# Patient Record
Sex: FEMALE | Race: WHITE | URBAN - METROPOLITAN AREA
[De-identification: names, ages, dates, MRNs, and addresses within clinical notes are randomized per-mention and may not be internally consistent; named-entity substitution may affect disease eponyms.]

---

## 2021-05-27 ENCOUNTER — HOSPITAL ENCOUNTER (OUTPATIENT)
Age: 46
Setting detail: SPECIMEN
Discharge: HOME | End: 2021-05-27
Payer: COMMERCIAL

## 2021-05-27 ENCOUNTER — OFFICE VISIT (OUTPATIENT)
Dept: OBGYN CLINIC | Age: 46
End: 2021-05-27
Payer: COMMERCIAL

## 2021-05-27 VITALS
WEIGHT: 180 LBS | DIASTOLIC BLOOD PRESSURE: 68 MMHG | BODY MASS INDEX: 31.89 KG/M2 | HEIGHT: 63 IN | SYSTOLIC BLOOD PRESSURE: 122 MMHG

## 2021-05-27 DIAGNOSIS — Z12.31 ENCOUNTER FOR SCREENING MAMMOGRAM FOR MALIGNANT NEOPLASM OF BREAST: Primary | ICD-10-CM

## 2021-05-27 DIAGNOSIS — Z01.419 WOMEN'S ANNUAL ROUTINE GYNECOLOGICAL EXAMINATION: ICD-10-CM

## 2021-05-27 DIAGNOSIS — N95.1 FEMALE CLIMACTERIC STATE: ICD-10-CM

## 2021-05-27 DIAGNOSIS — Z12.4 PAP SMEAR FOR CERVICAL CANCER SCREENING: ICD-10-CM

## 2021-05-27 PROCEDURE — 36415 COLL VENOUS BLD VENIPUNCTURE: CPT | Performed by: NURSE PRACTITIONER

## 2021-05-27 PROCEDURE — 99386 PREV VISIT NEW AGE 40-64: CPT | Performed by: NURSE PRACTITIONER

## 2021-05-27 NOTE — PROGRESS NOTES
YEARLY PHYSICAL    Date of service: 2021    Riley Sagastume  Is a 39 y.o. female    PT's PCP is: No primary care provider on file. : 1975                                             Subjective:       No LMP recorded. (Menstrual status: Other - See Notes). Are your menses regular: no menses w/Mirena    OB History    Para Term  AB Living   2 2 2     2   SAB TAB Ectopic Molar Multiple Live Births             2      # Outcome Date GA Lbr Neno/2nd Weight Sex Delivery Anes PTL Lv   2 Term  39w0d   M Vag-Spont   MICHAEL   1 Term  37w0d   F Vag-Spont   MICHAEL        Social History     Tobacco Use   Smoking Status Current Every Day Smoker    Packs/day: 0.50    Types: Cigarettes   Smokeless Tobacco Never Used        Social History     Substance and Sexual Activity   Alcohol Use Yes    Comment: rare       Family History   Problem Relation Age of Onset    No Known Problems Paternal Grandfather     No Known Problems Paternal Grandmother     No Known Problems Maternal Grandmother     No Known Problems Maternal Grandfather     No Known Problems Father     Diabetes Mother        Any family history of breast or ovarian cancer: No    Any family history of blood clots: No    Allergies: Pcn [penicillins]    No current outpatient medications on file. Social History     Substance and Sexual Activity   Sexual Activity Yes    Partners: Male       Any bleeding or pain with intercourse: No    Last Yearly:  2017    Last pap: 10/2016, negative     Last HPV: 10/2016    Last Mammogram:     Last Dexascan n/a    Last colorectal screen- n/a    Do you do self breast exams: encouraged monthly SBE     History reviewed. No pertinent past medical history.     Past Surgical History:   Procedure Laterality Date    FOOT SURGERY         Family History   Problem Relation Age of Onset    No Known Problems Paternal Grandfather     No Known grasped with ring forceps and with gentle downward traction IUD was removed intact. Patient tolerated well. Uterus:  Size normal, Tenderness absent  Adenexa: Masses absent, Tenderness absent  Anus/Perineum:  Lesions absent and Masses absent                                    Vaginal discharge: no vaginal discharge   Chaperone: not present                             Assessment and Plan          Diagnosis Orders   1. Encounter for screening mammogram for malignant neoplasm of breast  KATELYN DIGITAL SCREEN W OR WO CAD BILATERAL   2. Pap smear for cervical cancer screening  PAP SMEAR   3. Female climacteric state  Follicle Stimulating Hormone    Luteinizing Hormone    TSH With Reflex Ft4   4. Women's annual routine gynecological examination  hCG, Quantitative, Pregnancy       Mirena  2018. Patient still has not had menses, discussed potential suppression due to Mirena or menopause. Reports hot flashes at times since the Fall. Patient agreeable to proceed with labs, if not indicating menopause will start Slynd to manage menses. Reviewed risks/benefits. Francisco Sampson does not currently have medications on file. Return in about 1 year (around 2022) for yearly. She was also counseled on her preventative health maintenance recommendations and follow-up. There are no Patient Instructions on file for this visit.     Gary Kramer, APRN - NP,2021 8:25 AM

## 2021-06-02 LAB
CYTOLOGY REPORT: NORMAL
HPV SAMPLE: NORMAL
HPV, GENOTYPE 16: NOT DETECTED
HPV, GENOTYPE 18: NOT DETECTED
HPV, HIGH RISK OTHER: NOT DETECTED
HPV, INTERPRETATION: NORMAL
SPECIMEN DESCRIPTION: NORMAL

## 2023-10-12 ENCOUNTER — APPOINTMENT (OUTPATIENT)
Dept: CT IMAGING | Age: 48
End: 2023-10-12

## 2023-10-12 ENCOUNTER — HOSPITAL ENCOUNTER (EMERGENCY)
Age: 48
Discharge: HOME | End: 2023-10-12
Attending: EMERGENCY MEDICINE | Admitting: EMERGENCY MEDICINE

## 2023-10-12 VITALS
HEIGHT: 63 IN | RESPIRATION RATE: 18 BRPM | HEART RATE: 59 BPM | WEIGHT: 170 LBS | TEMPERATURE: 98 F | SYSTOLIC BLOOD PRESSURE: 144 MMHG | OXYGEN SATURATION: 94 % | DIASTOLIC BLOOD PRESSURE: 99 MMHG | BODY MASS INDEX: 30.12 KG/M2

## 2023-10-12 DIAGNOSIS — R94.31 NONSPECIFIC ST-T WAVE ELECTROCARDIOGRAPHIC CHANGES: ICD-10-CM

## 2023-10-12 DIAGNOSIS — K59.00 CONSTIPATION, UNSPECIFIED CONSTIPATION TYPE: ICD-10-CM

## 2023-10-12 DIAGNOSIS — K29.00 ACUTE GASTRITIS WITHOUT HEMORRHAGE, UNSPECIFIED GASTRITIS TYPE: Primary | ICD-10-CM

## 2023-10-12 LAB
ALBUMIN SERPL-MCNC: 4.7 G/DL (ref 3.5–4.6)
ALP SERPL-CCNC: 97 U/L (ref 40–130)
ALT SERPL-CCNC: 39 U/L (ref 0–33)
AMORPH SED URNS QL MICRO: ABNORMAL
ANION GAP SERPL CALCULATED.3IONS-SCNC: 11 MEQ/L (ref 9–15)
AST SERPL-CCNC: 21 U/L (ref 0–35)
BACTERIA URNS QL MICRO: ABNORMAL /HPF
BASOPHILS # BLD: 0.1 K/UL (ref 0–0.1)
BASOPHILS NFR BLD: 0.7 % (ref 0.1–1.2)
BILIRUB SERPL-MCNC: 0.6 MG/DL (ref 0.2–0.7)
BILIRUB UR QL STRIP: ABNORMAL
BUN SERPL-MCNC: 11 MG/DL (ref 6–20)
CALCIUM SERPL-MCNC: 9.4 MG/DL (ref 8.5–9.9)
CHLORIDE SERPL-SCNC: 99 MEQ/L (ref 95–107)
CLARITY UR: ABNORMAL
CO2 SERPL-SCNC: 29 MEQ/L (ref 20–31)
COLOR UR: ABNORMAL
CREAT SERPL-MCNC: 0.72 MG/DL (ref 0.5–0.9)
EOSINOPHIL # BLD: 0 K/UL (ref 0–0.4)
EOSINOPHIL NFR BLD: 0.1 % (ref 0.7–5.8)
EPI CELLS #/AREA URNS HPF: ABNORMAL /HPF
ERYTHROCYTE [DISTWIDTH] IN BLOOD BY AUTOMATED COUNT: 12.3 % (ref 11.7–14.4)
GLOBULIN SER CALC-MCNC: 2.8 G/DL (ref 2.3–3.5)
GLUCOSE SERPL-MCNC: 161 MG/DL (ref 70–99)
GLUCOSE UR STRIP-MCNC: NEGATIVE MG/DL
HCT VFR BLD AUTO: 47.3 % (ref 37–47)
HGB BLD-MCNC: 16.5 G/DL (ref 11.2–15.7)
HGB UR QL STRIP: NEGATIVE
IMM GRANULOCYTES # BLD: 0 K/UL
IMM GRANULOCYTES NFR BLD: 0.3 %
INR PPP: 1
KETONES UR STRIP-MCNC: 40 MG/DL
LACTATE BLDV-SCNC: 1.8 MMOL/L (ref 0.5–2.2)
LEUKOCYTE ESTERASE UR QL STRIP: NEGATIVE
LIPASE SERPL-CCNC: 22 U/L (ref 12–95)
LYMPHOCYTES # BLD: 1.6 K/UL (ref 1.2–3.7)
LYMPHOCYTES NFR BLD: 13 %
MAGNESIUM SERPL-MCNC: 2.1 MG/DL (ref 1.7–2.4)
MCH RBC QN AUTO: 30.2 PG (ref 25.6–32.2)
MCHC RBC AUTO-ENTMCNC: 34.9 % (ref 32.2–35.5)
MCV RBC AUTO: 86.6 FL (ref 79.4–94.8)
MONOCYTES # BLD: 0.7 K/UL (ref 0.2–0.9)
MONOCYTES NFR BLD: 5.4 % (ref 4.7–12.5)
NEUTROPHILS # BLD: 9.8 K/UL (ref 1.6–6.1)
NEUTS SEG NFR BLD: 80.5 % (ref 34–71.1)
NITRITE UR QL STRIP: NEGATIVE
PH UR STRIP: 7.5 [PH] (ref 5–9)
PLATELET # BLD AUTO: 464 K/UL (ref 182–369)
POTASSIUM SERPL-SCNC: 3.3 MEQ/L (ref 3.4–4.9)
PROT SERPL-MCNC: 7.5 G/DL (ref 6.3–8)
PROT UR STRIP-MCNC: 100 MG/DL
PROTHROMBIN TIME: 13 SEC (ref 12.3–14.9)
RBC # BLD AUTO: 5.46 M/UL (ref 3.93–5.22)
RBC #/AREA URNS HPF: ABNORMAL /HPF (ref 0–2)
SODIUM SERPL-SCNC: 139 MEQ/L (ref 135–144)
SP GR UR STRIP: 1.02 (ref 1–1.03)
TROPONIN, HIGH SENSITIVITY: <6 NG/L (ref 0–19)
UROBILINOGEN UR STRIP-ACNC: 2 E.U./DL
WBC # BLD AUTO: 12.2 K/UL (ref 4–10)
WBC #/AREA URNS HPF: ABNORMAL /HPF (ref 0–5)

## 2023-10-12 PROCEDURE — 85025 COMPLETE CBC W/AUTO DIFF WBC: CPT

## 2023-10-12 PROCEDURE — 93005 ELECTROCARDIOGRAM TRACING: CPT

## 2023-10-12 PROCEDURE — 6360000002 HC RX W HCPCS: Performed by: EMERGENCY MEDICINE

## 2023-10-12 PROCEDURE — 84484 ASSAY OF TROPONIN QUANT: CPT

## 2023-10-12 PROCEDURE — 36415 COLL VENOUS BLD VENIPUNCTURE: CPT

## 2023-10-12 PROCEDURE — 85610 PROTHROMBIN TIME: CPT

## 2023-10-12 PROCEDURE — 96376 TX/PRO/DX INJ SAME DRUG ADON: CPT

## 2023-10-12 PROCEDURE — 99285 EMERGENCY DEPT VISIT HI MDM: CPT

## 2023-10-12 PROCEDURE — 6370000000 HC RX 637 (ALT 250 FOR IP): Performed by: EMERGENCY MEDICINE

## 2023-10-12 PROCEDURE — 96375 TX/PRO/DX INJ NEW DRUG ADDON: CPT

## 2023-10-12 PROCEDURE — 80053 COMPREHEN METABOLIC PANEL: CPT

## 2023-10-12 PROCEDURE — 83605 ASSAY OF LACTIC ACID: CPT

## 2023-10-12 PROCEDURE — 81001 URINALYSIS AUTO W/SCOPE: CPT

## 2023-10-12 PROCEDURE — 96365 THER/PROPH/DIAG IV INF INIT: CPT

## 2023-10-12 PROCEDURE — C9113 INJ PANTOPRAZOLE SODIUM, VIA: HCPCS | Performed by: EMERGENCY MEDICINE

## 2023-10-12 PROCEDURE — 83690 ASSAY OF LIPASE: CPT

## 2023-10-12 PROCEDURE — 6360000004 HC RX CONTRAST MEDICATION: Performed by: EMERGENCY MEDICINE

## 2023-10-12 PROCEDURE — 74177 CT ABD & PELVIS W/CONTRAST: CPT

## 2023-10-12 PROCEDURE — 2580000003 HC RX 258: Performed by: EMERGENCY MEDICINE

## 2023-10-12 PROCEDURE — 83735 ASSAY OF MAGNESIUM: CPT

## 2023-10-12 RX ORDER — OMEPRAZOLE 40 MG/1
40 CAPSULE, DELAYED RELEASE ORAL
Qty: 30 CAPSULE | Refills: 1 | Status: SHIPPED | OUTPATIENT
Start: 2023-10-12

## 2023-10-12 RX ORDER — ONDANSETRON 4 MG/1
4 TABLET, ORALLY DISINTEGRATING ORAL
Qty: 10 TABLET | Refills: 1 | Status: SHIPPED | OUTPATIENT
Start: 2023-10-12

## 2023-10-12 RX ORDER — ONDANSETRON 4 MG/1
4 TABLET, ORALLY DISINTEGRATING ORAL
Qty: 10 TABLET | Refills: 1 | Status: SHIPPED | OUTPATIENT
Start: 2023-10-12 | End: 2023-10-12 | Stop reason: SDUPTHER

## 2023-10-12 RX ORDER — 0.9 % SODIUM CHLORIDE 0.9 %
1000 INTRAVENOUS SOLUTION INTRAVENOUS ONCE
Status: COMPLETED | OUTPATIENT
Start: 2023-10-12 | End: 2023-10-12

## 2023-10-12 RX ORDER — ONDANSETRON 2 MG/ML
4 INJECTION INTRAMUSCULAR; INTRAVENOUS ONCE
Status: COMPLETED | OUTPATIENT
Start: 2023-10-12 | End: 2023-10-12

## 2023-10-12 RX ORDER — KETOROLAC TROMETHAMINE 15 MG/ML
15 INJECTION, SOLUTION INTRAMUSCULAR; INTRAVENOUS ONCE
Status: COMPLETED | OUTPATIENT
Start: 2023-10-12 | End: 2023-10-12

## 2023-10-12 RX ORDER — POLYETHYLENE GLYCOL 3350 17 G/17G
17 POWDER, FOR SOLUTION ORAL DAILY PRN
Qty: 5 PACKET | Refills: 0 | Status: SHIPPED | OUTPATIENT
Start: 2023-10-12 | End: 2023-10-12 | Stop reason: SDUPTHER

## 2023-10-12 RX ORDER — OMEPRAZOLE 40 MG/1
40 CAPSULE, DELAYED RELEASE ORAL
Qty: 30 CAPSULE | Refills: 1 | Status: SHIPPED | OUTPATIENT
Start: 2023-10-12 | End: 2023-10-12 | Stop reason: SDUPTHER

## 2023-10-12 RX ORDER — POLYETHYLENE GLYCOL 3350 17 G/17G
17 POWDER, FOR SOLUTION ORAL DAILY PRN
Qty: 5 PACKET | Refills: 0 | Status: SHIPPED | OUTPATIENT
Start: 2023-10-12 | End: 2023-10-17

## 2023-10-12 RX ADMIN — ONDANSETRON 4 MG: 2 INJECTION INTRAMUSCULAR; INTRAVENOUS at 16:35

## 2023-10-12 RX ADMIN — ONDANSETRON 4 MG: 2 INJECTION INTRAMUSCULAR; INTRAVENOUS at 14:31

## 2023-10-12 RX ADMIN — Medication: at 16:17

## 2023-10-12 RX ADMIN — SODIUM CHLORIDE 1000 ML: 9 INJECTION, SOLUTION INTRAVENOUS at 14:29

## 2023-10-12 RX ADMIN — IOPAMIDOL 75 ML: 755 INJECTION, SOLUTION INTRAVENOUS at 15:08

## 2023-10-12 RX ADMIN — SODIUM CHLORIDE 80 MG: 9 INJECTION, SOLUTION INTRAVENOUS at 16:13

## 2023-10-12 RX ADMIN — KETOROLAC TROMETHAMINE 15 MG: 15 INJECTION, SOLUTION INTRAMUSCULAR; INTRAVENOUS at 14:31

## 2023-10-12 ASSESSMENT — LIFESTYLE VARIABLES: HOW OFTEN DO YOU HAVE A DRINK CONTAINING ALCOHOL: NEVER

## 2023-10-12 NOTE — ED PROVIDER NOTES
CC/HPI: 44-year-old female to the emergency department chief complaint of nausea vomiting epigastric discomfort and feeling constipated. Patient denies sick contacts no recent travel. No fever no chills. Patient states the constipation has been for 5 days she states that she does not feel like she has had a good bowel movement. She has noticed no blood in her stools no dark tarry stools. Patient states the vomiting began 2 days ago she has thrown up approximately 8-10 times. No blood in her vomit. Patient denies chest pain or shortness of breath no lightheadedness or dizziness. Patient denies take any anti-inflammatories or drinking alcohol other than on occasion      VITALS/PMH/PSH: Reviewed per nurses notes    REVIEW OF SYSTEMS: As in chief complaint history of present illness, otherwise all other systems are reviewed and negative the total 10 systems reviewed    PHYSICAL EXAM:  GEN: Pt alert and oriented, no acute distress. Patient appears uncomfortable. Appears nauseated. HEENT:         Normocephalic/Atramatic        PERRL, EOMI       Throat non-edematous. No erythema noted. No exudates noted. Moist membranes  NECK: Nontender, no signs of trauma, no lymphadenopathy  HEART: Reg S1/S2, without murmer, rub or gallop  LUNGS: Clear to auscultation bilaterally, respirations even and unlabored  ABDOMEN: Bowel sounds positive, soft, nondistended. Patient with mild appearing tenderness lower abdomen. More intense appearing tenderness upper abdomen bilaterally mostly over epigastrium. No pulsatile masses. No guarding rebound or rigidity. MUSCULOSKELETAL/EXTREMITITES:  No signs of trauma, cyanosis or edema. No CVA tenderness  LYMPH: no peripheral lympadenopathy noted  SKIN:  Warm & dry, no rash  NEUROLOGIC:  Alert and oriented.   Speech clear    Medical decision making/ED course;  44-year-old female to the emergency department with nausea vomiting x2 days constipation x5 days and generalized abdominal

## 2023-10-13 LAB
EKG ATRIAL RATE: 54 BPM
EKG P AXIS: 35 DEGREES
EKG P-R INTERVAL: 150 MS
EKG Q-T INTERVAL: 440 MS
EKG QRS DURATION: 76 MS
EKG QTC CALCULATION (BAZETT): 417 MS
EKG R AXIS: 11 DEGREES
EKG T AXIS: 240 DEGREES
EKG VENTRICULAR RATE: 54 BPM

## 2023-10-13 PROCEDURE — 93010 ELECTROCARDIOGRAM REPORT: CPT | Performed by: INTERNAL MEDICINE

## 2024-12-26 ENCOUNTER — APPOINTMENT (OUTPATIENT)
Dept: GENERAL RADIOLOGY | Age: 49
End: 2024-12-26

## 2024-12-26 ENCOUNTER — HOSPITAL ENCOUNTER (EMERGENCY)
Age: 49
Discharge: HOME | End: 2024-12-26

## 2024-12-26 VITALS
HEART RATE: 77 BPM | WEIGHT: 160 LBS | DIASTOLIC BLOOD PRESSURE: 82 MMHG | TEMPERATURE: 98.1 F | OXYGEN SATURATION: 96 % | BODY MASS INDEX: 28.35 KG/M2 | SYSTOLIC BLOOD PRESSURE: 110 MMHG | RESPIRATION RATE: 18 BRPM | HEIGHT: 63 IN

## 2024-12-26 DIAGNOSIS — S93.401A SPRAIN OF RIGHT ANKLE, UNSPECIFIED LIGAMENT, INITIAL ENCOUNTER: Primary | ICD-10-CM

## 2024-12-26 PROCEDURE — 73630 X-RAY EXAM OF FOOT: CPT

## 2024-12-26 PROCEDURE — 6370000000 HC RX 637 (ALT 250 FOR IP)

## 2024-12-26 PROCEDURE — 99283 EMERGENCY DEPT VISIT LOW MDM: CPT

## 2024-12-26 PROCEDURE — 73610 X-RAY EXAM OF ANKLE: CPT

## 2024-12-26 RX ORDER — IBUPROFEN 600 MG/1
600 TABLET, FILM COATED ORAL ONCE
Status: COMPLETED | OUTPATIENT
Start: 2024-12-26 | End: 2024-12-26

## 2024-12-26 RX ORDER — IBUPROFEN 600 MG/1
600 TABLET, FILM COATED ORAL 3 TIMES DAILY PRN
Qty: 30 TABLET | Refills: 0 | Status: SHIPPED | OUTPATIENT
Start: 2024-12-26

## 2024-12-26 RX ADMIN — IBUPROFEN 600 MG: 600 TABLET, FILM COATED ORAL at 11:08

## 2024-12-26 ASSESSMENT — PAIN DESCRIPTION - LOCATION
LOCATION: ANKLE
LOCATION: ANKLE

## 2024-12-26 ASSESSMENT — PAIN DESCRIPTION - PAIN TYPE: TYPE: ACUTE PAIN

## 2024-12-26 ASSESSMENT — PAIN DESCRIPTION - ORIENTATION
ORIENTATION: RIGHT
ORIENTATION: RIGHT

## 2024-12-26 ASSESSMENT — ENCOUNTER SYMPTOMS
RESPIRATORY NEGATIVE: 1
COLOR CHANGE: 0

## 2024-12-26 ASSESSMENT — PAIN - FUNCTIONAL ASSESSMENT: PAIN_FUNCTIONAL_ASSESSMENT: 0-10

## 2024-12-26 ASSESSMENT — PAIN DESCRIPTION - FREQUENCY: FREQUENCY: CONTINUOUS

## 2024-12-26 ASSESSMENT — LIFESTYLE VARIABLES
HOW OFTEN DO YOU HAVE A DRINK CONTAINING ALCOHOL: MONTHLY OR LESS
HOW MANY STANDARD DRINKS CONTAINING ALCOHOL DO YOU HAVE ON A TYPICAL DAY: 1 OR 2

## 2024-12-26 ASSESSMENT — PAIN SCALES - GENERAL
PAINLEVEL_OUTOF10: 9
PAINLEVEL_OUTOF10: 8

## 2024-12-26 ASSESSMENT — PAIN DESCRIPTION - DESCRIPTORS: DESCRIPTORS: SHARP

## 2024-12-26 ASSESSMENT — PAIN DESCRIPTION - ONSET: ONSET: ON-GOING

## 2024-12-26 NOTE — ED PROVIDER NOTES
[facility] ED  EMERGENCY DEPARTMENT ENCOUNTER      Pt Name: Sandy Joy  MRN: [id number]  Birthdate 1975  Date of evaluation: 12/26/2024  Provider: RAMANA Amato CNP      HISTORY OF PRESENT ILLNESS    Sandy Joy is a 49 y.o. female who presents to the Emergency Department with ankle pain from a fall yesterday.  Reports twisting Right ankle when walking down steps.  Has pain and swelling.  Denies any other injury.        REVIEW OF SYSTEMS       Review of Systems   Constitutional: Negative.    Respiratory: Negative.     Cardiovascular: Negative.    Genitourinary: Negative.    Musculoskeletal:  Positive for arthralgias, gait problem and joint swelling. Negative for myalgias and neck pain.   Skin:  Negative for color change.   Neurological:  Positive for numbness.   Hematological: Negative.    Psychiatric/Behavioral: Negative.           PAST MEDICAL HISTORY   History reviewed. No pertinent past medical history.      SURGICAL HISTORY       Past Surgical History:   Procedure Laterality Date    FOOT SURGERY           CURRENT MEDICATIONS       Current Discharge Medication List        CONTINUE these medications which have NOT CHANGED    Details   omeprazole (PRILOSEC) 40 MG delayed release capsule Take 1 capsule by mouth every morning (before breakfast)  Qty: 30 capsule, Refills: 1      ondansetron (ZOFRAN-ODT) 4 MG disintegrating tablet Take 1 tablet by mouth every 4-6 hours as needed for Nausea or Vomiting  Qty: 10 tablet, Refills: 1             ALLERGIES     Pcn [penicillins]    FAMILY HISTORY       Family History   Problem Relation Age of Onset    No Known Problems Paternal Grandfather     No Known Problems Paternal Grandmother     No Known Problems Maternal Grandmother     No Known Problems Maternal Grandfather     No Known Problems Father     Diabetes Mother           SOCIAL HISTORY       Social History     Socioeconomic History    Marital status: Legally      Spouse name: None

## 2024-12-26 NOTE — ED TRIAGE NOTES
Patient fell down one step and twisted right ankle yesterday. Pt denies LOC. She has swelling and pain with ambulation.

## 2025-02-14 ENCOUNTER — APPOINTMENT (OUTPATIENT)
Dept: CT IMAGING | Age: 50
DRG: 055 | End: 2025-02-14
Payer: COMMERCIAL

## 2025-02-14 ENCOUNTER — HOSPITAL ENCOUNTER (INPATIENT)
Age: 50
LOS: 1 days | Discharge: HOME | DRG: 055 | End: 2025-02-15
Attending: STUDENT IN AN ORGANIZED HEALTH CARE EDUCATION/TRAINING PROGRAM | Admitting: SURGERY
Payer: COMMERCIAL
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
## 2025-02-14 DIAGNOSIS — I60.9 SUBARACHNOID HEMORRHAGE (HCC): Primary | ICD-10-CM

## 2025-02-14 DIAGNOSIS — Z00.6 ENCOUNTER FOR EXAMINATION FOR NORMAL COMPARISON OR CONTROL IN CLINICAL RESEARCH PROGRAM: ICD-10-CM

## 2025-02-14 PROBLEM — R00.1 BRADYCARDIA: Status: ACTIVE | Noted: 2025-02-14

## 2025-02-14 PROBLEM — T14.90XA TRAUMA: Status: ACTIVE | Noted: 2025-02-14

## 2025-02-14 LAB
ABO GROUP BLD: NORMAL
ALBUMIN SERPL BCG-MCNC: 4.6 G/DL (ref 3.5–5.1)
ALP SERPL-CCNC: 84 U/L (ref 38–126)
ALT SERPL W/O P-5'-P-CCNC: 22 U/L (ref 11–66)
AMPHETAMINES UR QL SCN: NEGATIVE
ANION GAP SERPL CALC-SCNC: 13 MEQ/L (ref 8–16)
APTT PPP: 30.8 SECONDS (ref 22–38)
AST SERPL-CCNC: 20 U/L (ref 5–40)
B-HCG SERPL QL: NEGATIVE
BACTERIA: NORMAL
BARBITURATES UR QL SCN: NEGATIVE
BASOPHILS ABSOLUTE: 0.1 THOU/MM3 (ref 0–0.1)
BASOPHILS NFR BLD AUTO: 0.8 %
BENZODIAZ UR QL SCN: NEGATIVE
BILIRUB CONJ SERPL-MCNC: < 0.1 MG/DL (ref 0–0.3)
BILIRUB SERPL-MCNC: 0.4 MG/DL (ref 0.3–1.2)
BILIRUB UR QL STRIP: NEGATIVE
BUN SERPL-MCNC: 13 MG/DL (ref 7–22)
BUN SERPL-MCNC: 13 MG/DL (ref 7–22)
BZE UR QL SCN: NEGATIVE
CALCIUM SERPL-MCNC: 8.5 MG/DL (ref 8.5–10.5)
CANNABINOIDS UR QL SCN: NEGATIVE
CASTS #/AREA URNS LPF: NORMAL /LPF
CASTS #/AREA URNS LPF: NORMAL /LPF
CHARACTER UR: NORMAL
CHARCOAL URNS QL MICRO: NORMAL
CHLORIDE SERPL-SCNC: 106 MEQ/L (ref 98–111)
CHLORIDE SERPL-SCNC: 106 MEQ/L (ref 98–111)
CO2 SERPL-SCNC: 21 MEQ/L (ref 23–33)
COLOR UR: NORMAL
COMPREHENSIVE DRUG PROMPT: NORMAL
CREAT SERPL-MCNC: 0.6 MG/DL (ref 0.4–1.2)
CREAT SERPL-MCNC: 0.7 MG/DL (ref 0.4–1.2)
CRYSTALS URNS QL MICRO: NORMAL
DEPRECATED RDW RBC AUTO: 41.3 FL (ref 35–45)
DEPRECATED RDW RBC AUTO: 41.8 FL (ref 35–45)
EOSINOPHIL NFR BLD AUTO: 1.4 %
EOSINOPHILS ABSOLUTE: 0.2 THOU/MM3 (ref 0–0.4)
EPITHELIAL CELLS, UA: NORMAL /HPF
ERYTHROCYTE [DISTWIDTH] IN BLOOD BY AUTOMATED COUNT: 12.7 % (ref 11.5–14.5)
ERYTHROCYTE [DISTWIDTH] IN BLOOD BY AUTOMATED COUNT: 12.8 % (ref 11.5–14.5)
ETHANOL SERPL-MCNC: < 0.01 % (ref 0–0.08)
ETHANOL SERPL-MCNC: < 0.01 % (ref 0–0.08)
FENTANYL: NEGATIVE
GFR SERPL CREATININE-BSD FRML MDRD: > 90 ML/MIN/1.73M2
GFR SERPL CREATININE-BSD FRML MDRD: > 90 ML/MIN/1.73M2
GLUCOSE SERPL-MCNC: 88 MG/DL (ref 70–108)
GLUCOSE SERPL-MCNC: 92 MG/DL (ref 70–108)
GLUCOSE UR QL STRIP.AUTO: NEGATIVE MG/DL
HCT VFR BLD AUTO: 43.6 % (ref 37–47)
HCT VFR BLD AUTO: 44.6 % (ref 37–47)
HGB BLD-MCNC: 14.8 GM/DL (ref 12–16)
HGB BLD-MCNC: 15.5 GM/DL (ref 12–16)
HGB UR QL STRIP.AUTO: NEGATIVE
IAT IGG-SP REAG SERPL QL: NORMAL
IMM GRANULOCYTES # BLD AUTO: 0.04 THOU/MM3 (ref 0–0.07)
IMM GRANULOCYTES NFR BLD AUTO: 0.3 %
INR PPP: 0.97 (ref 0.85–1.13)
KETONES UR QL STRIP.AUTO: NEGATIVE
LEUKOCYTE ESTERASE UR QL STRIP.AUTO: NEGATIVE
LIPASE SERPL-CCNC: 27.9 U/L (ref 5.6–51.3)
LYMPHOCYTES ABSOLUTE: 4.2 THOU/MM3 (ref 1–4.8)
LYMPHOCYTES NFR BLD AUTO: 36.4 %
MAGNESIUM SERPL-MCNC: 2.3 MG/DL (ref 1.6–2.4)
MCH RBC QN AUTO: 30.1 PG (ref 26–33)
MCH RBC QN AUTO: 30.9 PG (ref 26–33)
MCHC RBC AUTO-ENTMCNC: 33.9 GM/DL (ref 32.2–35.5)
MCHC RBC AUTO-ENTMCNC: 34.8 GM/DL (ref 32.2–35.5)
MCV RBC AUTO: 88.6 FL (ref 81–99)
MCV RBC AUTO: 88.8 FL (ref 81–99)
MONOCYTES ABSOLUTE: 0.8 THOU/MM3 (ref 0.4–1.3)
MONOCYTES NFR BLD AUTO: 7 %
MRSA DNA SPEC QL NAA+PROBE: NEGATIVE
NEUTROPHILS ABSOLUTE: 6.2 THOU/MM3 (ref 1.8–7.7)
NEUTROPHILS NFR BLD AUTO: 54.1 %
NITRITE UR QL STRIP.AUTO: NEGATIVE
NRBC BLD AUTO-RTO: 0 /100 WBC
OPIATES UR QL SCN: NORMAL
OSMOLALITY SERPL CALC.SUM OF ELEC: 278.9 MOSMOL/KG (ref 275–300)
OSMOLALITY SERPL CALC.SUM OF ELEC: 279.2 MOSMOL/KG (ref 275–300)
OXYCODONE: NORMAL
PH UR STRIP.AUTO: 6.5 [PH] (ref 5–9)
PHENCYCLIDINE QUANTITATIVE URINE: NEGATIVE
PLATELET # BLD AUTO: 413 THOU/MM3 (ref 130–400)
PLATELET # BLD AUTO: 424 THOU/MM3 (ref 130–400)
PMV BLD AUTO: 11.1 FL (ref 9.4–12.4)
PMV BLD AUTO: 11.4 FL (ref 9.4–12.4)
POTASSIUM SERPL-SCNC: 3.8 MEQ/L (ref 3.5–5.2)
POTASSIUM SERPL-SCNC: 3.9 MEQ/L (ref 3.5–5.2)
PROCALCITONIN SERPL IA-MCNC: < 0.02 NG/ML (ref 0.01–0.09)
PROT SERPL-MCNC: 6.6 G/DL (ref 6.1–8)
PROT UR STRIP.AUTO-MCNC: NEGATIVE MG/DL
RBC # BLD AUTO: 4.92 MILL/MM3 (ref 4.2–5.4)
RBC # BLD AUTO: 5.02 MILL/MM3 (ref 4.2–5.4)
RBC #/AREA URNS HPF: NORMAL /HPF
RENAL EPI CELLS #/AREA URNS HPF: NORMAL /[HPF]
RH BLD: NORMAL
SODIUM SERPL-SCNC: 140 MEQ/L (ref 135–145)
SODIUM SERPL-SCNC: 140 MEQ/L (ref 135–145)
SPECIFIC GRAVITY UA: 1.02 (ref 1–1.03)
UROBILINOGEN, URINE: 0.2 EU/DL (ref 0–1)
WBC # BLD AUTO: 11.5 THOU/MM3 (ref 4.8–10.8)
WBC # BLD AUTO: 13.7 THOU/MM3 (ref 4.8–10.8)
WBC #/AREA URNS HPF: NORMAL /HPF
YEAST LIKE FUNGI URNS QL MICRO: NORMAL

## 2025-02-14 PROCEDURE — 84703 CHORIONIC GONADOTROPIN ASSAY: CPT

## 2025-02-14 PROCEDURE — 96375 TX/PRO/DX INJ NEW DRUG ADDON: CPT

## 2025-02-14 PROCEDURE — 84295 ASSAY OF SERUM SODIUM: CPT

## 2025-02-14 PROCEDURE — 6360000002 HC RX W HCPCS: Performed by: PHYSICIAN ASSISTANT

## 2025-02-14 PROCEDURE — 85610 PROTHROMBIN TIME: CPT

## 2025-02-14 PROCEDURE — 82947 ASSAY GLUCOSE BLOOD QUANT: CPT

## 2025-02-14 PROCEDURE — 96376 TX/PRO/DX INJ SAME DRUG ADON: CPT

## 2025-02-14 PROCEDURE — 80076 HEPATIC FUNCTION PANEL: CPT

## 2025-02-14 PROCEDURE — G0378 HOSPITAL OBSERVATION PER HR: HCPCS

## 2025-02-14 PROCEDURE — 85730 THROMBOPLASTIN TIME PARTIAL: CPT

## 2025-02-14 PROCEDURE — 80305 DRUG TEST PRSMV DIR OPT OBS: CPT

## 2025-02-14 PROCEDURE — 70450 CT HEAD/BRAIN W/O DYE: CPT

## 2025-02-14 PROCEDURE — 92610 EVALUATE SWALLOWING FUNCTION: CPT

## 2025-02-14 PROCEDURE — 82435 ASSAY OF BLOOD CHLORIDE: CPT

## 2025-02-14 PROCEDURE — 86900 BLOOD TYPING SEROLOGIC ABO: CPT

## 2025-02-14 PROCEDURE — 82565 ASSAY OF CREATININE: CPT

## 2025-02-14 PROCEDURE — 97166 OT EVAL MOD COMPLEX 45 MIN: CPT

## 2025-02-14 PROCEDURE — 83735 ASSAY OF MAGNESIUM: CPT

## 2025-02-14 PROCEDURE — 2500000003 HC RX 250 WO HCPCS: Performed by: PHYSICIAN ASSISTANT

## 2025-02-14 PROCEDURE — 86901 BLOOD TYPING SEROLOGIC RH(D): CPT

## 2025-02-14 PROCEDURE — 84145 PROCALCITONIN (PCT): CPT

## 2025-02-14 PROCEDURE — 96374 THER/PROPH/DIAG INJ IV PUSH: CPT

## 2025-02-14 PROCEDURE — 6370000000 HC RX 637 (ALT 250 FOR IP): Performed by: PHYSICIAN ASSISTANT

## 2025-02-14 PROCEDURE — 84132 ASSAY OF SERUM POTASSIUM: CPT

## 2025-02-14 PROCEDURE — 83690 ASSAY OF LIPASE: CPT

## 2025-02-14 PROCEDURE — 99222 1ST HOSP IP/OBS MODERATE 55: CPT | Performed by: NEUROLOGICAL SURGERY

## 2025-02-14 PROCEDURE — 85025 COMPLETE CBC W/AUTO DIFF WBC: CPT

## 2025-02-14 PROCEDURE — 80048 BASIC METABOLIC PNL TOTAL CA: CPT

## 2025-02-14 PROCEDURE — 80307 DRUG TEST PRSMV CHEM ANLYZR: CPT

## 2025-02-14 PROCEDURE — 86885 COOMBS TEST INDIRECT QUAL: CPT

## 2025-02-14 PROCEDURE — 36415 COLL VENOUS BLD VENIPUNCTURE: CPT

## 2025-02-14 PROCEDURE — 82077 ASSAY SPEC XCP UR&BREATH IA: CPT

## 2025-02-14 PROCEDURE — 87641 MR-STAPH DNA AMP PROBE: CPT

## 2025-02-14 PROCEDURE — 85027 COMPLETE CBC AUTOMATED: CPT

## 2025-02-14 PROCEDURE — 97535 SELF CARE MNGMENT TRAINING: CPT

## 2025-02-14 PROCEDURE — 92523 SPEECH SOUND LANG COMPREHEN: CPT

## 2025-02-14 PROCEDURE — 84520 ASSAY OF UREA NITROGEN: CPT

## 2025-02-14 PROCEDURE — 99285 EMERGENCY DEPT VISIT HI MDM: CPT

## 2025-02-14 PROCEDURE — 99222 1ST HOSP IP/OBS MODERATE 55: CPT | Performed by: SURGERY

## 2025-02-14 PROCEDURE — 81001 URINALYSIS AUTO W/SCOPE: CPT

## 2025-02-14 PROCEDURE — 2140000000 HC CCU INTERMEDIATE R&B

## 2025-02-14 PROCEDURE — 6820000002 HC L2 INJURY CALL ACTIVATION: Performed by: SURGERY

## 2025-02-14 RX ORDER — ONDANSETRON 4 MG/1
4 TABLET, ORALLY DISINTEGRATING ORAL EVERY 8 HOURS PRN
Status: DISCONTINUED | OUTPATIENT
Start: 2025-02-14 | End: 2025-02-15 | Stop reason: HOSPADM

## 2025-02-14 RX ORDER — POTASSIUM CHLORIDE 29.8 MG/ML
20 INJECTION INTRAVENOUS PRN
Status: DISCONTINUED | OUTPATIENT
Start: 2025-02-14 | End: 2025-02-15 | Stop reason: HOSPADM

## 2025-02-14 RX ORDER — MORPHINE SULFATE 4 MG/ML
4 INJECTION, SOLUTION INTRAMUSCULAR; INTRAVENOUS
Status: DISCONTINUED | OUTPATIENT
Start: 2025-02-14 | End: 2025-02-15 | Stop reason: HOSPADM

## 2025-02-14 RX ORDER — PANTOPRAZOLE SODIUM 40 MG/1
40 TABLET, DELAYED RELEASE ORAL
Status: CANCELLED | OUTPATIENT
Start: 2025-02-14

## 2025-02-14 RX ORDER — POLYETHYLENE GLYCOL 3350 17 G/17G
17 POWDER, FOR SOLUTION ORAL DAILY
Status: DISCONTINUED | OUTPATIENT
Start: 2025-02-14 | End: 2025-02-15 | Stop reason: HOSPADM

## 2025-02-14 RX ORDER — SODIUM CHLORIDE 0.9 % (FLUSH) 0.9 %
5-40 SYRINGE (ML) INJECTION PRN
Status: DISCONTINUED | OUTPATIENT
Start: 2025-02-14 | End: 2025-02-15 | Stop reason: HOSPADM

## 2025-02-14 RX ORDER — SODIUM CHLORIDE 9 MG/ML
INJECTION, SOLUTION INTRAVENOUS PRN
Status: DISCONTINUED | OUTPATIENT
Start: 2025-02-14 | End: 2025-02-15 | Stop reason: HOSPADM

## 2025-02-14 RX ORDER — LEVETIRACETAM 500 MG/5ML
500 INJECTION, SOLUTION, CONCENTRATE INTRAVENOUS EVERY 12 HOURS
Status: DISCONTINUED | OUTPATIENT
Start: 2025-02-14 | End: 2025-02-15 | Stop reason: HOSPADM

## 2025-02-14 RX ORDER — ACETAMINOPHEN 325 MG/1
650 TABLET ORAL EVERY 4 HOURS PRN
Status: DISCONTINUED | OUTPATIENT
Start: 2025-02-14 | End: 2025-02-15 | Stop reason: HOSPADM

## 2025-02-14 RX ORDER — SODIUM CHLORIDE 0.9 % (FLUSH) 0.9 %
5-40 SYRINGE (ML) INJECTION EVERY 12 HOURS SCHEDULED
Status: DISCONTINUED | OUTPATIENT
Start: 2025-02-14 | End: 2025-02-15 | Stop reason: HOSPADM

## 2025-02-14 RX ORDER — POTASSIUM CHLORIDE 7.45 MG/ML
10 INJECTION INTRAVENOUS PRN
Status: DISCONTINUED | OUTPATIENT
Start: 2025-02-14 | End: 2025-02-15 | Stop reason: HOSPADM

## 2025-02-14 RX ORDER — MORPHINE SULFATE 2 MG/ML
2 INJECTION, SOLUTION INTRAMUSCULAR; INTRAVENOUS
Status: DISCONTINUED | OUTPATIENT
Start: 2025-02-14 | End: 2025-02-15 | Stop reason: HOSPADM

## 2025-02-14 RX ORDER — MAGNESIUM SULFATE IN WATER 40 MG/ML
2000 INJECTION, SOLUTION INTRAVENOUS PRN
Status: DISCONTINUED | OUTPATIENT
Start: 2025-02-14 | End: 2025-02-15 | Stop reason: HOSPADM

## 2025-02-14 RX ORDER — ONDANSETRON 2 MG/ML
4 INJECTION INTRAMUSCULAR; INTRAVENOUS EVERY 6 HOURS PRN
Status: DISCONTINUED | OUTPATIENT
Start: 2025-02-14 | End: 2025-02-15 | Stop reason: HOSPADM

## 2025-02-14 RX ADMIN — LEVETIRACETAM 500 MG: 100 INJECTION INTRAVENOUS at 15:33

## 2025-02-14 RX ADMIN — POLYETHYLENE GLYCOL 3350 17 G: 17 POWDER, FOR SOLUTION ORAL at 09:36

## 2025-02-14 RX ADMIN — SODIUM CHLORIDE, PRESERVATIVE FREE 10 ML: 5 INJECTION INTRAVENOUS at 09:36

## 2025-02-14 RX ADMIN — ACETAMINOPHEN 650 MG: 325 TABLET ORAL at 09:48

## 2025-02-14 RX ADMIN — LEVETIRACETAM 500 MG: 100 INJECTION INTRAVENOUS at 03:39

## 2025-02-14 RX ADMIN — ACETAMINOPHEN 650 MG: 325 TABLET ORAL at 22:27

## 2025-02-14 RX ADMIN — MORPHINE SULFATE 2 MG: 2 INJECTION, SOLUTION INTRAMUSCULAR; INTRAVENOUS at 02:34

## 2025-02-14 RX ADMIN — MORPHINE SULFATE 2 MG: 2 INJECTION, SOLUTION INTRAMUSCULAR; INTRAVENOUS at 22:27

## 2025-02-14 RX ADMIN — ONDANSETRON 4 MG: 2 INJECTION, SOLUTION INTRAMUSCULAR; INTRAVENOUS at 02:34

## 2025-02-14 ASSESSMENT — PAIN SCALES - GENERAL
PAINLEVEL_OUTOF10: 9
PAINLEVEL_OUTOF10: 7
PAINLEVEL_OUTOF10: 2
PAINLEVEL_OUTOF10: 9
PAINLEVEL_OUTOF10: 2

## 2025-02-14 ASSESSMENT — PAIN DESCRIPTION - LOCATION
LOCATION: HEAD

## 2025-02-14 ASSESSMENT — PATIENT HEALTH QUESTIONNAIRE - PHQ9
SUM OF ALL RESPONSES TO PHQ QUESTIONS 1-9: 1
1. LITTLE INTEREST OR PLEASURE IN DOING THINGS: NOT AT ALL
SUM OF ALL RESPONSES TO PHQ QUESTIONS 1-9: 1
SUM OF ALL RESPONSES TO PHQ9 QUESTIONS 1 & 2: 1
2. FEELING DOWN, DEPRESSED OR HOPELESS: SEVERAL DAYS
SUM OF ALL RESPONSES TO PHQ QUESTIONS 1-9: 1
SUM OF ALL RESPONSES TO PHQ QUESTIONS 1-9: 1

## 2025-02-14 ASSESSMENT — PAIN DESCRIPTION - DESCRIPTORS
DESCRIPTORS: ACHING;SORE
DESCRIPTORS: ACHING
DESCRIPTORS: ACHING
DESCRIPTORS: ACHING;SORE

## 2025-02-14 ASSESSMENT — ENCOUNTER SYMPTOMS
FACIAL SWELLING: 0
APNEA: 0
ABDOMINAL DISTENTION: 0
SHORTNESS OF BREATH: 0
BACK PAIN: 1
ABDOMINAL PAIN: 0
COUGH: 0

## 2025-02-14 ASSESSMENT — PAIN DESCRIPTION - PAIN TYPE: TYPE: ACUTE PAIN

## 2025-02-14 ASSESSMENT — PAIN - FUNCTIONAL ASSESSMENT
PAIN_FUNCTIONAL_ASSESSMENT: ACTIVITIES ARE NOT PREVENTED
PAIN_FUNCTIONAL_ASSESSMENT: ACTIVITIES ARE NOT PREVENTED

## 2025-02-14 ASSESSMENT — PAIN DESCRIPTION - ORIENTATION
ORIENTATION: RIGHT;LEFT;ANTERIOR;POSTERIOR
ORIENTATION: POSTERIOR
ORIENTATION: POSTERIOR

## 2025-02-14 ASSESSMENT — LIFESTYLE VARIABLES
HOW MANY STANDARD DRINKS CONTAINING ALCOHOL DO YOU HAVE ON A TYPICAL DAY: 1 OR 2
HOW OFTEN DO YOU HAVE A DRINK CONTAINING ALCOHOL: MONTHLY OR LESS

## 2025-02-14 NOTE — ED PROVIDER NOTES
[facility] EMERGENCY DEPARTMENT      EMERGENCY MEDICINE     Pt Name: Sandy Joy  MRN: [id number]  Birthdate 1975  Date of evaluation: 2/14/2025  Provider: Evan Delgado DO  Supervising Physician: Chico Pulido DO    CHIEF COMPLAINT       Chief Complaint   Patient presents with    Fall    trauma transfer     HISTORY OF PRESENT ILLNESS   Sandy Joy is a 49 y.o. female who presents to the emergency department from outside hospital for evaluation of a known traumatic subarachnoid hemorrhage.  Patient states yesterday evening she was walking in her house when she slipped and fell down 2 steps striking her head.  Patient thinks she had LOC.  She complains of some mild lower back pain, otherwise did not have any complaints.  Patient not on any blood thinners.  Patient received TXA at outside hospital    PASTMEDICAL HISTORY   History reviewed. No pertinent past medical history.    Patient Active Problem List   Diagnosis Code    Trauma T14.90XA     SURGICAL HISTORY       Past Surgical History:   Procedure Laterality Date    FOOT SURGERY         CURRENT MEDICATIONS       Previous Medications    IBUPROFEN (ADVIL;MOTRIN) 600 MG TABLET    Take 1 tablet by mouth 3 times daily as needed for Pain    OMEPRAZOLE (PRILOSEC) 40 MG DELAYED RELEASE CAPSULE    Take 1 capsule by mouth every morning (before breakfast)       ALLERGIES     is allergic to pcn [penicillins].    FAMILY HISTORY     She indicated that the status of her mother is unknown. She indicated that the status of her father is unknown. She indicated that the status of her maternal grandmother is unknown. She indicated that the status of her maternal grandfather is unknown. She indicated that the status of her paternal grandmother is unknown. She indicated that the status of her paternal grandfather is unknown.       SOCIAL HISTORY       Social History     Tobacco Use    Smoking status: Every Day     Current packs/day: 0.50     Types: Cigarettes

## 2025-02-14 NOTE — PROGRESS NOTES
[facility]  INPATIENT OCCUPATIONAL THERAPY  STR ICU 4D  EVALUATION       Discharge Recommendations: Continue to assess pending progress, Home with assist PRN  Equipment Recommendations: No         Time In: 1021  Time Out: 1043  Timed Code Treatment Minutes: 12 Minutes  Minutes: 22          Date: 2025  Patient Name: Sandy Joy,   Gender: female      MRN: [id number]  : 1975  (49 y.o.)  Referring Practitioner: Mandi Leahy PA-C  Diagnosis: Trauma  Additional Pertinent Hx: Per H & P: 49-year-old female presents to the ED via EMS after sustaining a fall earlier in the evening.  She states she was going down her front steps and slipped, fell and hit the back of her head.  She states she did lose consciousness for short period of time.  She was able to get up and go into the house and spoke to her family.  The patient was received from [facility] after CT scan showed subarachnoid hemorrhage.  Patient is alert and cooperative, pleasant with exam.  She states she has some blurry vision, body aches and back pain. Per CT of head : Stable subarachnoid hemorrhage between the frontal lobes.    Restrictions/Precautions:  Restrictions/Precautions: Fall Risk  Position Activity Restriction  Other Position/Activity Restrictions: low stim guidlines    Subjective  Chart Reviewed: Yes, Orders, History and Physical, Progress Notes  Patient assessed for rehabilitation services?: Yes  Family / Caregiver Present: No    Subjective: cooperative    Pain: no number 7/10: headache    Vitals: Blood Pressure: 113/65  Oxygen: 96% on RA  Heart Rate: 55    Social/Functional History:  Lives With: Alone  Type of Home: House  Home Layout: One level  Home Access: Ramped entrance   Bathroom Shower/Tub: Tub/Shower unit  Bathroom Toilet: Standard  Bathroom Equipment: Shower chair       Prior Level of Assist for ADLs: Independent  Prior Level of Assist for Homemaking: Independent  Prior Level of Assist  patient left in safe position with all fall risk precautions in place.

## 2025-02-14 NOTE — PROGRESS NOTES
Brief Intervention and Referral to Treatment Summary    Patient was provided PHQ-9, AUDIT-C and DAST Screening:      PHQ-9 Score: 1  AUDIT-C Score:  1  DAST Score:  0    Patient’s substance use is considered     Low Risk/Healthy      Patient’s depression is considered:     Minimal    Brief Education Was Provided    N/A      Brief Intervention Is Provided (Only for AUDIT-C or DAST)     N/A      Injured Trauma Survivor Screening  1.  When you were injured or right afterward   Did you think you were going to die? RESPONSES; YES+1/NO: NO  Do you think this was done to you intentionally? NO    Since your injury  Have you felt more restless, tense or jumpy than usual? RESPONSES; YES+1/NO: YES  +1  Have you found yourself unable to stop worrying? RESPONSES; YES+1/NO: NO  Do you find yourself thinking that the world is unsafe and that people are not to be trusted? RESPONSES; YES+1/NO: NO    TOTAL SCORE from ITSS Questions 1 and 2: 1  NOTE: A score of greater than or equal to 2 is considered positive for PTSD risk and is to receive a community resource packet to link with appropriate providers.    Recommendations/Referrals for Brief and/or Specialized Treatment Provided to Patient:  N/A

## 2025-02-14 NOTE — PROGRESS NOTES
Patient arrived to unit from ED via bed. Patient transferred to ICU bed and placed on continuous ICU bedside monitor. Patient admitted for Subarachnoid hemorrhage (HCC) [I60.9]  Trauma [T14.90XA]  Fall, initial encounter [W19.XXXA]. Vitals obtained. See flowsheets. Patient's IV access includes 20g IV R AC. Current infusions and rates of infusion include none. Assessment completed by Evangelina MILAN. Two nurse skin assessment completed by Evangelina MILAN and Karen MILAN. See flowsheets for assessment details. Policies and procedures of ICU able to be explained to patient at this time. Family member(s)/representative(s) present at time of admission include none. Patient rights explained to family member(s)/representatives and patient, as able. Patient/patient's family member(s)/representative(s) Declined to have physician notified of their admission. All questions posed by patient's family member(s)/representative(s) and patient answered at this time.

## 2025-02-14 NOTE — PLAN OF CARE
Patient had fall. Trauma called and we were consulted.   CT noted subarachnoid hemorrhaged. Given TXA. Repeat CT Head this morning showed stable subarachnoid hemorrhage. Neurosurgery consulted  Keppra prescribed for seizure prophylaxis.   Maintain SBP between 110-160  PT and OT consulted.        sodium chloride        sodium chloride flush  5-40 mL IntraVENous 2 times per day    polyethylene glycol  17 g Oral Daily    levETIRAcetam  500 mg IntraVENous Q12H     Case discussed with resident physician.  Plans for CT head in one week noted.  Ok to transition out of ICU.  Will sign off.  Electronically signed by Calixto Baldwin MD.

## 2025-02-14 NOTE — H&P
Trauma history and physical  Dr. Moy    Patient:  Sandy Joy  Admit date: 2/14/2025   YOB: 1975 Date of Evaluation: 2/14/2025  MRN: [id number]  Acct: [id number]    Injury Date: 02/13/2025  injury time: PTA  PCP: No primary care provider on file.   Referring physician: Dr. Pulido    Time of Trauma Surgeon Notification:  1230 February 14, 2025  Time of Trauma BLAKE Arrival: 1240  Time of Trauma Surgeon Arrival: 3:30 AM February 14, 2025  Services Requested Within 30 Minutes:   Time Contacted:    Assessment:    Trauma by fall  Subarachnoid hemorrhage  Scalp hematoma  Plan:    Admit to ICU under Trauma Services   -Bedrest   -NPO   -Telemetry     Trauma by fall   - Fall precautions   - PT, OT eval and treat    Subarachnoid hemorrhage   -Consult neurosurgery    -Neuro checks per unit routine   -Maintain systolic blood pressure between 100-160   -Trauma dose TXA given    -Elevate head of bed approximately 30 degrees   -Hold all anticoagulant and antiplatelet medications   -Repeat head CT in AM   -Seizure precautions     Pain control   - morphine, tylenol       Consults: Intensivist, neurosurgery  Code status: Full code  Diet: N.p.o.  Activity: Bedrest  Prophylaxis: SCDs, IS, C&DB, Pepcid, stool softeners  Repeat labs in AM  IVF hydration  PT, OT, SLP eval and treat  Discharge disposition pending clinical course   - From home    Activation: []Level I (Trauma Alert) [x]Level II (Injury Call) []Level III (Trauma Consult) []Downgraded  Mode of Arrival: EMS transportation  Referring Facility: [facility]  Loss of Consciousness []No [x]Yes[]Unknown  UNK Duration(min)  Mechanism of Injury:  []Motor Vehicle crash   []Single Vehicle [] []Passenger []Scene Fatality []Front Seat  []Restrained   []Air Bag Deployed   []Ejected []Rollover []Pedestrian []Trapped   Type of vehicle:   Protective Devices:   []Motorcycle  Wearing Helmet []Yes []No  []Bicycle  Wearing Helmet []Yes []No  [x]Fall  and Sexual Activity    Alcohol use: Yes     Comment: rare    Drug use: Never    Sexual activity: Yes     Partners: Male     Family History   Problem Relation Age of Onset    No Known Problems Paternal Grandfather     No Known Problems Paternal Grandmother     No Known Problems Maternal Grandmother     No Known Problems Maternal Grandfather     No Known Problems Father     Diabetes Mother        Home medications:    Previous Medications    IBUPROFEN (ADVIL;MOTRIN) 600 MG TABLET    Take 1 tablet by mouth 3 times daily as needed for Pain    OMEPRAZOLE (PRILOSEC) 40 MG DELAYED RELEASE CAPSULE    Take 1 capsule by mouth every morning (before breakfast)       Hospital medications:  Scheduled Meds:   sodium chloride flush  5-40 mL IntraVENous 2 times per day    polyethylene glycol  17 g Oral Daily    levETIRAcetam  500 mg IntraVENous Q12H     Continuous Infusions:   sodium chloride       PRN Meds:sodium chloride flush, sodium chloride, potassium chloride **OR** potassium chloride, magnesium sulfate, ondansetron **OR** ondansetron, acetaminophen, morphine **OR** morphine  Objective   ED TRIAGE VITALS  BP: 111/82, Temp: 98.4 °F (36.9 °C), Pulse: 61, Respirations: 14, SpO2: 96 %  [place] Coma Scale  Eye Opening: Spontaneous  Best Verbal Response: Oriented  Best Motor Response: Obeys commands  [place] Coma Scale Score: 15  Results for orders placed or performed during the hospital encounter of 02/14/25   Rapid drug screen, urine   Result Value Ref Range    COMPREHENSIVE DRUG PROMPT N/A        Physical Exam:  Patient Vitals for the past 24 hrs:   BP Temp Temp src Pulse Resp SpO2   02/14/25 0113 111/82 -- -- 61 14 96 %   02/14/25 0107 -- 98.4 °F (36.9 °C) Oral -- -- --   02/14/25 0058 (!) 138/114 -- -- 61 10 98 %   02/14/25 0058 137/77 -- -- 58 18 99 %     Primary Assessment:  Airway: Patent, trachea midline  Breathing: Breath sounds present and equal bilaterally, spontaneous, and unlabored  Circulation: Hemodynamically stable,

## 2025-02-14 NOTE — CONSULTS
[place], [place]                                          NEUROSURGICAL CONSULTATION NOTE       Sandy Joy   YOB: 1975  Account Number: [id number]   Date of Examination: 2/14/2025    ASSESSMENT:    -This is a 49-year-old male s/p ground-level fall who underwent a brain CT that showed possible small traumatic subarachnoid hemorrhage between the frontal lobes   -GCS: 14/15  -Focal neurological deficit: slightly confused but there is No focal deficit at this time.   -The repeated brain CT showed stable exam     PLAN:        -Medical management per ICU team and patient primary.  -Keep systolic blood pressure less than 160 and above 100.  -A dose of TXA to be given to the patient.  -Coagulation profile.  -Stop any anticoagulation medication at this time.  -Reverse any coagulopathy per protocol.  -Seizure precaution.  -No acute neurosurgical intervention is indicated at this time.  -New brain CT after 1 week and follow-up the results with patient's primary care provider.   -Okay for Lovenox from tomorrow for DVT prophylaxis.  -Resuming any anticoagulation medication after 3 days ( and/or  based on the risk to benefit ratio).  -Patient can be discharged from neurosurgical perspective once she/he is cleared by other medical services.  -Follow-up with neurosurgery outpatient clinic as needed.  - From this time on, neurosurgery team will see this patient only as needed as long as she is in the hospital. Please call if you have any further questions or concerns regarding this patient.    -I discussed the case in detail with ICU team, trauma team, with patient and her family.  All questions and concerns were addressed and answered   HISTORY OF PRESENT ILLNESS:  Sandy Joy is a 49 y.o. female, admitted on :2/14/2025 12:53 AM    This is 49-year-old who presented to the ER for evaluation of injury sustained after patient had a ground-level fall. There is no obvious history of  reviewing images and labs personally, and speaking with team.    Macarena Bertrand MD,MD  Electronically signed 2/14/2025

## 2025-02-14 NOTE — ED NOTES
Patient laying in bed texting on phone. No signs of distress noted at this time. Resp even and unlabored.

## 2025-02-14 NOTE — PROGRESS NOTES
[facility]  INPATIENT PHYSICAL THERAPY  EVALUATION  [facility] CCU 3A - 3A-03/003-A    Discharge Recommendations: Continue to assess pending progress, Home with Home health PT, Outpatient PT, Therapy recommended at discharge  Equipment Recommendations:    monitor for needs             Time In: 1436  Time Out: 1500  Timed Code Treatment Minutes: 9 Minutes  Minutes: 24          Date: 2025  Patient Name: Sandy Joy,  Gender:  female        MRN: [id number]  : 1975  (49 y.o.)      Referring Practitioner: Mandi Leahy PA-C  Diagnosis: Trauma  Additional Pertinent Hx: Per H & P: 49-year-old female presents to the ED via EMS after sustaining a fall earlier in the evening.  She states she was going down her front steps and slipped, fell and hit the back of her head.  She states she did lose consciousness for short period of time.  She was able to get up and go into the house and spoke to her family.  The patient was received from [facility] after CT scan showed subarachnoid hemorrhage.  Patient is alert and cooperative, pleasant with exam.  She states she has some blurry vision, body aches and back pain. Per CT of head : Stable subarachnoid hemorrhage between the frontal lobes.     Restrictions/Precautions:  Restrictions/Precautions: Fall Risk, General Precautions       Other Position/Activity Restrictions: low stim guidelines           Subjective:  Chart Reviewed: Yes  Patient assessed for rehabilitation services?: Yes  Subjective: Pt resting in bed and agrees to therapy.    General:     Vision: Impaired  Vision Exceptions: Wears glasses at all times  Hearing: Within functional limits       Pain: 7-8/10: back and head    Vitals: Blood Pressure: 112/62  Oxygen: 90%  Heart Rate: 55    Social/Functional History:    Lives With: Alone  Type of Home: House  Home Layout: Laundry in basement, One level  Home Access: Ramped entrance  Home Equipment: None     Bathroom Shower/Tub:

## 2025-02-14 NOTE — PROGRESS NOTES
During my encounter with the 49 yr old patient, I attempted to visit with the pt on 3A. The patient appears to be resting (not responsive/resting) now and I didn't want to disturb the patient. I or another  will attempt to visit the patient or the family at another time. The pt was admitted due to bradycardia.

## 2025-02-14 NOTE — CARE COORDINATION
Case Management Assessment Initial Evaluation    Date/Time of Evaluation: 2025 9:30 AM  Assessment Completed by: Nai Lewis RN    If patient is discharged prior to next notation, then this note serves as note for discharge by case management.    Patient Name: Sandy Joy                   YOB: 1975  Diagnosis: Subarachnoid hemorrhage (HCC) [I60.9]  Trauma [T14.90XA]  Fall, initial encounter [W19.XXXA]                   Date / Time: 2025 12:53 AM  [facility]     Patient Admission Status: Inpatient   Readmission Risk Low 0-14, Mod 15-19), High > 20: Readmission Risk Score: 6.6    Current PCP: No primary care provider on file.  Health Care Decision Makers:     Additional Case Management Notes: Presented to [place] ED after a fall on the ice. Pt was going down her front steps, slipped, fell and hit back of head. Reports +LOC for short period of time. Pt was able to get up and go into house and spoke to her family. Reports some blurry vision, body aches, and back pain. CT revealed SAH. Transferred to [facility]. TXA given. Admitted to ICU. Neurosurgery and Intensivist consulted. Order to transfer to stepdown today.     NIH 0. Ox4. Follows commands. Afebrile. SB 40's-50's. On room air. Telemetry, SCDs. IV keppra, Electrolyte replacement protocols. WBC 11.5.     Procedures: none    Imagin/14 CT Head: Stable subarachnoid hemorrhage between the frontal lobes. No new abnormalities.     Patient Goals/Plan/Treatment Preferences: Home to apartment in building next to her mother's house. Denies needs, declines HH.      25 1205   Service Assessment   Patient Orientation Alert and Oriented   Cognition Alert   History Provided By Patient   Primary Caregiver Self   Accompanied By/Relationship n/a   Support Systems Family Members;Spouse/Significant Other;Parent   Patient's Healthcare Decision Maker is: Patient Declined (Legal Next of Kin Remains as Decision Maker)   PCP Verified by

## 2025-02-14 NOTE — PROGRESS NOTES
[facility]  SPEECH THERAPY  STRZ ICU 4D  Speech - Language - Cognitive Evaluation + Clinical Swallow Evaluation    Discharge Recommendations: Continue to Assess Pending Progress  DIET ORDER RECOMMENDATIONS AFTER EVALUATION: Regular diet + thin liquids  STRATEGIES: Full Upright Position, Small Bite/Sip, and Limit Distractions     SLP Individual Minutes  Time In: 0849  Time Out: 09  Minutes: 25  Timed Code Treatment Minutes: 0 Minutes     Speech, Language, Cognitive Evaluation: 17  Clinical Swallow Evaluation: 8    Date: 2025  Patient Name: Sandy Joy      CSN: [id number]   : 1975  (49 y.o.)  Gender: female   Referring Physician:  Mandi Leahy PA-C   Diagnosis: Trauma  Precautions: low stimulation guidelines  History of Present Illness/Injury: This pleasant 49-year-old female presents to the ED via EMS after sustaining a fall earlier in the evening.  She states she was going down her front steps and slipped, fell and hit the back of her head.  She states she did lose consciousness for short period of time.  She was able to get up and go into the house and spoke to her family.  The patient was received from [facility] after CT scan showed subarachnoid hemorrhage.  Patient is alert and cooperative, pleasant with exam.  She states she has some blurry vision, body aches and back pain.      On arrival to ICU, patient is found alert oriented X.3.  Patient still complains of back pain.  Patient is on room air.      Patient had fall. Trauma called and we were consulted.   CT noted subarachnoid hemorrhaged. Given TXA. Repeat CT Head this morning showed stable subarachnoid hemorrhage. Neurosurgery consulted  Keppra prescribed for seizure prophylaxis.   Maintain SBP between 110-160  PT and OT consulted.            History reviewed. No pertinent past medical history.    Pain: headache 7/10    Subjective:  Patient seen with RN approval. RN reports patient with repeat imaging  Cognitive linguistic evaluation was completed with score of 26 derived, indicating a mod-I functioning. ACE score of 12/22 calculated; It should be noted that a score with concerns for concussion are greater than 12/22.      SWALLOWING:    Respiratory Status: Room Air      Behavioral Observation: Alert and Oriented    CRANIAL NERVE ASSESSMENT   CN V (Trigeminal) Closes and Opens Mandible WFL    Rotary Jaw Movement WFL      CN VII (Facial) Cheeks Hold Food out of Sulci WFL    Opens, Closes/Seals, Protrudes, Retracts Lips WFL    General Appearance WFL    Sensation WFL      CN X (Vagus - Pharyngeal) Raises Back of Tongue WFL      CN XI (Accessory) Lifts Soft Palate WFL      CN XII (Hypoglossal) Elevates Tongue Up and Back WFL    Protrusion   WFL    Lateralizes Tongue WFL    Sensation Not Tested      Other Observations Dentition WFL    Vocal Quality Clear    Cough Not assessed     PATIENT WAS EVALUATED USING:  Thin Liquids, Puree, and Coarse Solids    ORAL PHASE:  WFL    PHARYNGEAL PHASE:  WFL:  Pharyngeal phase appears WFL but cannot rule out pharyngeal phase deficits from a bedside swallowing evaluation alone.    SIGNS AND SYMPTOMS OF LARYNGEAL PENETRATION / ASPIRATION:  No signs/symptoms of aspiration evident in this evaluation, but cannot rule out silent aspiration.    INSTRUMENTAL EVALUATION: Instrumental evaluation not indicated at this time.    ASPIRATION PNEUMONIA PREDICTORS:  Limited Mobility and Chronic Medical Issues/Pertinent Co-Morbidities    FUNCTIONAL ORAL INTAKE SCALE: Total Oral Intake: 7.  Total oral intake with no restrictions         RECOMMENDATIONS/ASSESSMENT:  DIAGNOSTIC IMPRESSIONS:  Clinical Swallow Evaluation: Patient presents with oral phase of swallow largely intact with inability to fully assess potential pharyngeal deficits without formal instrumental assessment which is not clinically indicated at this time. Oral phase of swallow essentially unremarkable with consistent pharyngeal trigger

## 2025-02-14 NOTE — PLAN OF CARE
Problem: Discharge Planning  Goal: Discharge to home or other facility with appropriate resources  Outcome: Progressing  Flowsheets (Taken 2/14/2025 1112)  Discharge to home or other facility with appropriate resources:   Identify barriers to discharge with patient and caregiver   Identify discharge learning needs (meds, wound care, etc)  Note: Patient from home . PT/ OT mobilized today to help with discharge planning .      Problem: Pain  Goal: Verbalizes/displays adequate comfort level or baseline comfort level  Outcome: Progressing  Flowsheets  Taken 2/14/2025 1112 by Jia Nolasco RN  Verbalizes/displays adequate comfort level or baseline comfort level:   Encourage patient to monitor pain and request assistance   Administer analgesics based on type and severity of pain and evaluate response   Assess pain using appropriate pain scale   Implement non-pharmacological measures as appropriate and evaluate response  Taken 2/14/2025 0220 by Evangelina Reynaga RN  Verbalizes/displays adequate comfort level or baseline comfort level:   Assess pain using appropriate pain scale   Administer analgesics based on type and severity of pain and evaluate response   Encourage patient to monitor pain and request assistance  Note: PRN medications given  , patient having c/o headache and generalized body aches .      Problem: Safety - Adult  Goal: Free from fall injury  Outcome: Progressing

## 2025-02-14 NOTE — ED TRIAGE NOTES
Patient to ED via [facility] as a trauma transfer. Patient was walking down steps and fell down two steps and hit head. Patient fell backwards. Patient unsure of LOC. Patient received 1g TXA per [facility] along with a percocet for pain. Patient arrives with GCS 15. Complaining of some tenderness to back and elbow. Small bump noted to back of head.

## 2025-02-14 NOTE — PROGRESS NOTES
Patient was educated on fall precautions including use of bed alarm and call light for assistance with transfers. Patient was also educated on low stimuli protocols and verbalized understanding. Low stimulation alert taped to door outside room.

## 2025-02-14 NOTE — ED NOTES
MICHELINE Romero at bedside speaking with patient regarding POC. Patient verbalizes understanding.

## 2025-02-14 NOTE — CONSULTS
CRITICAL CARE CONSULT NOTE       Patient:  Sandy Joy    Unit/Bed:4D-18/018-A  YOB: 1975  MRN: [id number]   PCP: No primary care provider on file.  Date of Admission: 2/14/2025  Chief Complaint:-Trauma    Assessment and Plan:    Fall incident/trauma.  Trauma surgery primary  Subarachnoid hemorrhage, s/p TXA: Trauma surgery primary.  Repeat CT head without change.  Will continue monitoring.  Fall/aspiration precautions.  Frequent neurochecks.  Daily CT head without contrast.  Neurosurgery following.  Supportive care.  Pain control.  GERD: Home med omeprazole switched to Protonix due to hospital formulary T.  Mild sinus bradycardia: Noted on EKG.  Resolving  INITIAL H AND P AND ICU COURSE:  This pleasant 49-year-old female presents to the ED via EMS after sustaining a fall earlier in the evening.  She states she was going down her front steps and slipped, fell and hit the back of her head.  She states she did lose consciousness for short period of time.  She was able to get up and go into the house and spoke to her family.  The patient was received from [facility] after CT scan showed subarachnoid hemorrhage.  Patient is alert and cooperative, pleasant with exam.  She states she has some blurry vision, body aches and back pain.     On arrival to ICU, patient is found alert oriented X.3.  Patient still complains of back pain.  Patient is on room air.        Past Medical History: EMR.  Family History: Hypertension.  Social History: Denies illicit drug.    ROS   General.  Complains of back pain.  Cardiovascular.  Denies chest pain, shortness of breath or palpitations.  Pulmonary.  Denies shortness of breath, cough  Gastroenterology.  Denies abdominal pain, diarrhea  Psychiatric.  Denies suicidal thoughts.  Neurology.  Complains of back pain.    Scheduled Meds:   sodium chloride flush  5-40 mL IntraVENous 2 times per day    polyethylene glycol  17 g Oral Daily    levETIRAcetam  500 mg

## 2025-02-15 VITALS
WEIGHT: 163.8 LBS | RESPIRATION RATE: 18 BRPM | HEART RATE: 54 BPM | DIASTOLIC BLOOD PRESSURE: 63 MMHG | BODY MASS INDEX: 29.02 KG/M2 | TEMPERATURE: 98 F | SYSTOLIC BLOOD PRESSURE: 99 MMHG | OXYGEN SATURATION: 95 %

## 2025-02-15 PROCEDURE — 6370000000 HC RX 637 (ALT 250 FOR IP): Performed by: PHYSICIAN ASSISTANT

## 2025-02-15 PROCEDURE — 97116 GAIT TRAINING THERAPY: CPT

## 2025-02-15 PROCEDURE — 97530 THERAPEUTIC ACTIVITIES: CPT

## 2025-02-15 PROCEDURE — 2500000003 HC RX 250 WO HCPCS: Performed by: PHYSICIAN ASSISTANT

## 2025-02-15 PROCEDURE — G0378 HOSPITAL OBSERVATION PER HR: HCPCS

## 2025-02-15 PROCEDURE — 96376 TX/PRO/DX INJ SAME DRUG ADON: CPT

## 2025-02-15 PROCEDURE — 99239 HOSP IP/OBS DSCHRG MGMT >30: CPT | Performed by: SURGERY

## 2025-02-15 PROCEDURE — 6360000002 HC RX W HCPCS: Performed by: PHYSICIAN ASSISTANT

## 2025-02-15 RX ORDER — ENOXAPARIN SODIUM 100 MG/ML
40 INJECTION SUBCUTANEOUS DAILY
Status: DISCONTINUED | OUTPATIENT
Start: 2025-02-15 | End: 2025-02-15 | Stop reason: HOSPADM

## 2025-02-15 RX ADMIN — LEVETIRACETAM 500 MG: 100 INJECTION INTRAVENOUS at 01:11

## 2025-02-15 RX ADMIN — ACETAMINOPHEN 650 MG: 325 TABLET ORAL at 05:37

## 2025-02-15 RX ADMIN — POLYETHYLENE GLYCOL 3350 17 G: 17 POWDER, FOR SOLUTION ORAL at 08:20

## 2025-02-15 RX ADMIN — SODIUM CHLORIDE, PRESERVATIVE FREE 10 ML: 5 INJECTION INTRAVENOUS at 08:17

## 2025-02-15 ASSESSMENT — PAIN DESCRIPTION - LOCATION
LOCATION: HEAD

## 2025-02-15 ASSESSMENT — PAIN SCALES - GENERAL
PAINLEVEL_OUTOF10: 2
PAINLEVEL_OUTOF10: 3
PAINLEVEL_OUTOF10: 2

## 2025-02-15 ASSESSMENT — PAIN - FUNCTIONAL ASSESSMENT: PAIN_FUNCTIONAL_ASSESSMENT: ACTIVITIES ARE NOT PREVENTED

## 2025-02-15 ASSESSMENT — PAIN DESCRIPTION - ORIENTATION: ORIENTATION: MID

## 2025-02-15 ASSESSMENT — PAIN DESCRIPTION - DESCRIPTORS: DESCRIPTORS: ACHING

## 2025-02-15 NOTE — DISCHARGE INSTRUCTIONS
Hold blood thinners and ibuprofen for 4 days    Follow up with PCP or trauma clinic in 1 weeks with CT head    Low stimuation environment

## 2025-02-15 NOTE — PROGRESS NOTES
[facility]  INPATIENT PHYSICAL THERAPY  DAILY NOTE  STRZ CVICU 4B - 4B-08/008-A      Discharge Recommendations: Continue to assess pending progress, Home with Home Health PT, Home with Outpatient PT, and Patient would benefit from continued PT at discharge  Equipment Recommendations:    monitor for needs          Time In: 910  Time Out: 935  Timed Code Treatment Minutes: 25 Minutes  Minutes: 25          Date: 2/15/2025  Patient Name: Sandy Joy,  Gender:  female        MRN: [id number]  : 1975  (49 y.o.)     Referring Practitioner: Mandi Leahy PA-C  Diagnosis: Trauma  Additional Pertinent Hx: Per H & P: 49-year-old female presents to the ED via EMS after sustaining a fall earlier in the evening.  She states she was going down her front steps and slipped, fell and hit the back of her head.  She states she did lose consciousness for short period of time.  She was able to get up and go into the house and spoke to her family.  The patient was received from [facility] after CT scan showed subarachnoid hemorrhage.  Patient is alert and cooperative, pleasant with exam.  She states she has some blurry vision, body aches and back pain. Per CT of head : Stable subarachnoid hemorrhage between the frontal lobes.     Prior Level of Function:  Lives With: Alone  Type of Home: House  Home Layout: Laundry in basement, One level  Home Access: Ramped entrance  Home Equipment: None   Bathroom Shower/Tub: Tub/Shower unit  Bathroom Toilet: Standard  Bathroom Equipment: Shower chair    Prior Level of Assist for ADLs: Independent  Prior Level of Assist for Homemaking: Independent  Prior Level of Assist for Transfers: Independent  Active : Yes  Additional Comments: Pt reports fully indep PLOF. Reports driving to/from [place] a lot/staying with SO on weekends.  Prior Level of Assist for Ambulation: Independent household ambulator, with or without device  Has the patient had two or

## 2025-02-15 NOTE — PROGRESS NOTES
Patient discharged in stable condition. All belongings gathered and sent with patient. AVS printed and reviewed. Patient educated on new medications and follow up appointments. Patient verbalized understanding.

## 2025-02-15 NOTE — DISCHARGE SUMMARY
Discharge Summary   Trauma Services    Patient Identification:  Sandy Joy  : 1975  MRN: [id number]   Account: [id number]     Admit date: 2025  Discharge date: 02/15/25  Attending provider: Ramirez Moy MD        Primary care provider: No primary care provider on file.     Discharge Diagnoses:   Principal Problem:    Trauma  Active Problems:    Subarachnoid hemorrhage (HCC)    Fall    Bradycardia  Resolved Problems:    * No resolved hospital problems. *       Hospital Course:   Sandy Joy is a 49 y.o. female This pleasant 49-year-old female presents to the ED via EMS after sustaining a fall earlier in the evening.  She states she was going down her front steps and slipped, fell and hit the back of her head.  She states she did lose consciousness for short period of time.  She was able to get up and go into the house and spoke to her family.  The patient was received from [facility] after CT scan showed subarachnoid hemorrhage.  Patient is alert and cooperative, pleasant with exam.  She states she has some blurry vision, body aches and back pain.     Hospital course was unremarkable, patients symptoms improved repeat imaging without progerssion of bleed. NS signed off. Patient ok for dischareg.     Discharge Medications:     Medication List        CONTINUE taking these medications      ibuprofen 600 MG tablet  Commonly known as: ADVIL;MOTRIN  Take 1 tablet by mouth 3 times daily as needed for Pain            STOP taking these medications      ondansetron 4 MG disintegrating tablet  Commonly known as: ZOFRAN-ODT            ASK your doctor about these medications      omeprazole 40 MG delayed release capsule  Commonly known as: PRILOSEC  Take 1 capsule by mouth every morning (before breakfast)               Patient Instructions:    Discharge lab work: CThead  Activity: low stimulation   Diet: ADULT DIET; Regular    Code Status: Full Code    Follow-up visits:   Ramirez Moy,  Amphetamine+Methamphetamine Urine Screen negative NEGATIVE    Barbiturate Quant, Ur negative NEGATIVE    Benzodiazepine Quant, Ur negative NEGATIVE    Cannabinoid Quant, Ur negative NEGATIVE    Cocaine Metab Quant, Ur negative NEGATIVE    Opiates, Urine **POSITIVE** NEGATIVE    Phencyclidine Quantitative Urine negative NEGATIVE    Oxycodone **POSITIVE** NEGATIVE    Fentanyl Negative NEGATIVE       Discharge condition: Stable  Disposition: Home    Electronically signed by Fatou Oakes MD on 2/15/2025 at 12:06 PM

## 2025-02-15 NOTE — PROGRESS NOTES
Pharmacist Review and Automatic Dose Adjustment of Prophylactic Enoxaparin or Heparin    Reviewed reason for admission/hospital problem list    The reviewing pharmacist has made an adjustment to the ordered enoxaparin or heparin dose or converted to heparin per the approved [facility] protocol and table as identified below.      Recent Labs     02/14/25  0109 02/14/25  0330   CREATININE 0.7 0.6     Estimated Creatinine Clearance: 110 mL/min (based on SCr of 0.6 mg/dL).    Recent Labs     02/14/25  0109 02/14/25  0330   HGB 15.5 14.8   HCT 44.6 43.6   * 413*     Recent Labs     02/14/25  0109   INR 0.97       Height:   Ht Readings from Last 1 Encounters:   12/26/24 1.6 m (5' 2.99\")     Weight:  Wt Readings from Last 1 Encounters:   02/15/25 74.3 kg (163 lb 12.8 oz)       *Do not exceed enoxaparin 40mg daily or UFH 5000 units SUBQ TID in patients with epidurals,  lumbar drains, or external ventricular drains.    Plan:   Changed enoxaparin 30 mg subq BID to enoxaparin 40 mg subq daily.     Thank you,  Mick Lora, [facility]

## 2025-02-18 ENCOUNTER — OFFICE VISIT (OUTPATIENT)
Dept: FAMILY MEDICINE CLINIC | Age: 50
End: 2025-02-18

## 2025-02-18 VITALS
HEART RATE: 58 BPM | OXYGEN SATURATION: 98 % | DIASTOLIC BLOOD PRESSURE: 82 MMHG | SYSTOLIC BLOOD PRESSURE: 112 MMHG | HEIGHT: 62 IN | BODY MASS INDEX: 30.69 KG/M2 | WEIGHT: 166.8 LBS

## 2025-02-18 DIAGNOSIS — I60.9 SAH (SUBARACHNOID HEMORRHAGE) (HCC): Primary | ICD-10-CM

## 2025-02-18 DIAGNOSIS — Z00.00 WELLNESS EXAMINATION: ICD-10-CM

## 2025-02-18 SDOH — ECONOMIC STABILITY: FOOD INSECURITY: WITHIN THE PAST 12 MONTHS, THE FOOD YOU BOUGHT JUST DIDN'T LAST AND YOU DIDN'T HAVE MONEY TO GET MORE.: NEVER TRUE

## 2025-02-18 SDOH — ECONOMIC STABILITY: FOOD INSECURITY: WITHIN THE PAST 12 MONTHS, YOU WORRIED THAT YOUR FOOD WOULD RUN OUT BEFORE YOU GOT MONEY TO BUY MORE.: NEVER TRUE

## 2025-02-18 NOTE — PROGRESS NOTES
[facility]  [facility]  [address] [address]  [phone]    Sandy Joy (:  1975) is a 49 y.o. female, here for evaluation of the following chief complaint(s):  New Patient (Here to establish care - has not had a PCP in the past.  Would go to urgent cares or ER's for acute needs.  Did have recent hospitalization at [facility] for fall and brain bleed.  Was admitted from  - 2/15.  Does have f/u CT this Friday at trauma clinic in [place]. )        Assessment & Plan     ASSESSMENT/PLAN:  1. SAH (subarachnoid hemorrhage) (HCC)  She reports experiencing headaches, lightheadedness, and nausea since her discharge from the hospital. There is also a noted change in her appetite and energy level, which are uncharacteristic for her. A work note will be provided, excusing her from work until Monday. She is advised to continue using Tylenol for pain management. Mom reports that a CT scan is scheduled for Friday, and she is instructed to communicate any significant changes in her condition promptly. Depending on the results of the CT scan and her follow-up appointment with the nurse practitioner at [facility], further adjustments to her work status may be necessary. If her condition does not improve, cognitive therapy through speech therapy may be considered.    2. Wellness examination  Schedule follow up for wellness visit - may need Pap.  Discuss CRC screening, mammogram at that time also.  - HIV Screen; Future  - Hepatitis C Antibody; Future      Reviewed discharge summary from hospitalization, lab results, imaging results.    Assessment & Plan      Return in about 3 months (around 2025) for wellness.       There are no Patient Instructions on file for this visit.      Subjective   SUBJECTIVE/OBJECTIVE:  History of Present Illness  The patient presents for evaluation of post-traumatic brain injury symptoms.    She was

## 2025-02-19 LAB
6MAM UR QL: NOT DETECTED NG/ML
ANTICOAGULANTS, URINE: NOT DETECTED
ANTICONVULSANTS, URINE: PRESENT
ANTIDEPRESSANTS UR QL: NOT DETECTED
ANTIDIABETICS, URINE: NOT DETECTED
ANTIHISTAMINES, URINE: NOT DETECTED
ANTIPSYCHOTICS, URINE: NOT DETECTED
BENZODIAZ UR QL: NOT DETECTED
BRIVARACETAM, URINE: NOT DETECTED NG/ML
BUPRENORPHINE UR QL: NOT DETECTED NG/ML
CARBAMAZEPINE UR QL: NOT DETECTED NG/ML
CARBAMAZEPINE-10,11-EPOXIDE, URINE: NOT DETECTED NG/ML
CARDIAC, URINE: NOT DETECTED
CODEINE UR QL: NOT DETECTED NG/ML
COMPREHENSIVE DRUG PROMPT: NORMAL
COTININE UR QL: PRESENT NG/ML
CREAT UR-MCNC: 92.8 MG/DL (ref 20–400)
D-METHORPHAN UR QL: NOT DETECTED
DHC UR QL: NOT DETECTED NG/ML
DIHYDRO-10-HYDROXYCARBAMAZEPINE, URINE: NOT DETECTED NG/ML
EDDP UR QL: NOT DETECTED NG/ML
FELBAMATE, URINE: NOT DETECTED NG/ML
FENTANYL UR QL: NOT DETECTED NG/ML
GABAPENTIN UR QL: NOT DETECTED NG/ML
HCV IGG SERPL QL IA: NEGATIVE
HIV 1+2 AB+HIV1 P24 AG SERPL QL IA: NORMAL
HYDROCODONE UR QL: NOT DETECTED NG/ML
HYDROMORPHONE UR QL: NOT DETECTED NG/ML
LACOSAMIDE, URINE: NOT DETECTED NG/ML
LAMOTRIGINE, URINE: NOT DETECTED NG/ML
LEVETIRACETAM, URINE: PRESENT NG/ML
MEPERIDINE UR QL: NOT DETECTED NG/ML
METHADONE UR QL: NOT DETECTED NG/ML
MORPHINE UR QL: PRESENT NG/ML
MUSCLE RELAXANTS, URINE: NOT DETECTED
N-DESMETHYLTAPENTADOL, URINE: NOT DETECTED NG/ML
NALOXONE URINE: NOT DETECTED NG/ML
NICOTINE UR QL: PRESENT
NORBUPRENORPHINE, URINE: NOT DETECTED NG/ML
NORFENTANYL UR QL: NOT DETECTED NG/ML
NORMEPERIDINE UR QL: NOT DETECTED NG/ML
NSAIDS, URINE: PRESENT
O-NORTRAMADOL UR QL: NOT DETECTED NG/ML
OPIOIDS, URINE: PRESENT
OXYCODONE UR QL: PRESENT NG/ML
OXYMORPHONE UR QL: NOT DETECTED NG/ML
PREGABALIN, URINE: NOT DETECTED NG/ML
PRIMIDONE, URINE: NOT DETECTED NG/ML
RUFINAMIDE, URINE: NOT DETECTED NG/ML
SEDATIVE-HYPNOTICS, URINE: NOT DETECTED
STIMULANTS, URINE: NOT DETECTED
TAPENTADOL, URINE: NOT DETECTED NG/ML
TIAGABINE, URINE: NOT DETECTED NG/ML
TOPIRAMATE, URINE: NOT DETECTED NG/ML
TRAMADOL UR QL: NOT DETECTED NG/ML
ZONISAMIDE, URINE: NOT DETECTED NG/ML

## 2025-02-21 ENCOUNTER — OFFICE VISIT (OUTPATIENT)
Dept: SURGERY | Age: 50
End: 2025-02-21
Payer: COMMERCIAL

## 2025-02-21 VITALS
BODY MASS INDEX: 30.67 KG/M2 | SYSTOLIC BLOOD PRESSURE: 116 MMHG | DIASTOLIC BLOOD PRESSURE: 72 MMHG | HEART RATE: 59 BPM | TEMPERATURE: 97.3 F | WEIGHT: 166.7 LBS | HEIGHT: 62 IN | OXYGEN SATURATION: 97 %

## 2025-02-21 DIAGNOSIS — I60.9 SUBARACHNOID HEMORRHAGE (HCC): Primary | ICD-10-CM

## 2025-02-21 DIAGNOSIS — S09.90XA CLOSED HEAD INJURY, INITIAL ENCOUNTER: ICD-10-CM
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
## 2025-02-21 PROCEDURE — 99204 OFFICE O/P NEW MOD 45 MIN: CPT | Performed by: NURSE PRACTITIONER

## 2025-02-21 RX ORDER — ACETAMINOPHEN 325 MG/1
650 TABLET ORAL EVERY 6 HOURS PRN
COMMUNITY

## 2025-02-21 ASSESSMENT — ENCOUNTER SYMPTOMS
DIARRHEA: 0
EYE PAIN: 0
WHEEZING: 0
SHORTNESS OF BREATH: 0
STRIDOR: 0
PHOTOPHOBIA: 0
VOICE CHANGE: 0
EYE ITCHING: 0
FACIAL SWELLING: 0
ABDOMINAL DISTENTION: 0
BLOOD IN STOOL: 0
VOMITING: 0
CONSTIPATION: 0
CHOKING: 0
APNEA: 0
BACK PAIN: 1
COLOR CHANGE: 0
ABDOMINAL PAIN: 0
SINUS PRESSURE: 0
SORE THROAT: 0
CHEST TIGHTNESS: 0
RHINORRHEA: 0
NAUSEA: 1
COUGH: 0
TROUBLE SWALLOWING: 0
EYE REDNESS: 0
EYE DISCHARGE: 0

## 2025-02-21 NOTE — PROGRESS NOTES
Trauma Clinic  Kelsie Bo, APRN - CNP    Encounter Date: 2/21/2025  Patient:  Sandy Joy   Age: 49 y.o.   YOB: 1975   MRN: [id number]      Injury Date:02/13/2025    PCP: No primary care provider on file.     Subjective   Chief Complaint:   Chief Complaint   Patient presents with    Follow-up     S/p fall at home, subarachnoid hemorrhage 2/14/25, discharged 2/15/25       History Obtained From: chart review and the patient  Reason for Admission:   Principal Problem:    Trauma  Active Problems:    Subarachnoid hemorrhage (HCC)    Fall    Bradycardia    History of Present Illness:  She is a 49 y.o. female who presents to the office for follow up after discharge from the hospital.  She reports that she had fallen on 02/13/2025 when she slipped and fell backwards, hitting the back of her head on concrete.  States that she had ringing in her ears so she went to [facility] where a CT of the head noted a subarachnoid hemorrhage.  She was transferred to [facility] for neurosurgery and trauma consultation.  TXA and Keppra were administered.  A follow up head CT was obtained about 6 hours after she arrived to [facility] that showed a stable subarachnoid hemorrhage.  She was discharged home the following day.  She states that she does not feel she should have been discharged so soon.  She states that she was unsteady on her feet due to dizziness and was having issues with word finding and slow responses.  States that she continues to have difficulty with processing things, that it takes her a period of time to process and also respond.  Continues to have dizziness and feelings of being unsteady.  This is intermittent.  She was evaluated by PT, OT and SLP while in house.  OT recommended home with assist PRN, PT recommended home with home health, outpatient PT, therapy recommended at discharge and Speech therapy completed a cognitive evaluation with her scoring 26/30 on the MOCA and 12/22 on the ACE.

## 2025-02-24 ENCOUNTER — HOSPITAL ENCOUNTER (OUTPATIENT)
Dept: CT IMAGING | Age: 50
Discharge: HOME | End: 2025-02-24
Attending: SURGERY
Payer: COMMERCIAL

## 2025-02-24 DIAGNOSIS — I60.9 SUBARACHNOID HEMORRHAGE (HCC): ICD-10-CM

## 2025-02-24 PROCEDURE — 70450 CT HEAD/BRAIN W/O DYE: CPT

## 2025-02-28 ENCOUNTER — OFFICE VISIT (OUTPATIENT)
Dept: FAMILY MEDICINE CLINIC | Age: 50
End: 2025-02-28
Payer: COMMERCIAL

## 2025-02-28 VITALS
RESPIRATION RATE: 20 BRPM | OXYGEN SATURATION: 96 % | WEIGHT: 168.2 LBS | HEART RATE: 64 BPM | DIASTOLIC BLOOD PRESSURE: 80 MMHG | BODY MASS INDEX: 30.76 KG/M2 | SYSTOLIC BLOOD PRESSURE: 108 MMHG

## 2025-02-28 DIAGNOSIS — I60.9 SUBARACHNOID HEMORRHAGE (HCC): Primary | ICD-10-CM
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
## 2025-02-28 PROBLEM — R00.1 BRADYCARDIA: Status: RESOLVED | Noted: 2025-02-14 | Resolved: 2025-02-28

## 2025-02-28 PROBLEM — T14.90XA TRAUMA: Status: RESOLVED | Noted: 2025-02-14 | Resolved: 2025-02-28

## 2025-02-28 PROCEDURE — 99213 OFFICE O/P EST LOW 20 MIN: CPT | Performed by: STUDENT IN AN ORGANIZED HEALTH CARE EDUCATION/TRAINING PROGRAM

## 2025-02-28 ASSESSMENT — ENCOUNTER SYMPTOMS
DIARRHEA: 0
CONSTIPATION: 0
SHORTNESS OF BREATH: 0
VOMITING: 0
COUGH: 0
NAUSEA: 0

## 2025-02-28 NOTE — PROGRESS NOTES
[facility]  [address] [address]  [phone]     Sandy Joy is a 49 y.o. female who presents today for:  Chief Complaint   Patient presents with    Follow-up     Has been off since 2/14 and wanting to go back. Had a concussion and brain bleed. CT on 2/24       Assessment/Plan:     Sandy was seen today for follow-up.    Diagnoses and all orders for this visit:    Subarachnoid hemorrhage (HCC)      Subarachnoid hemorrhage: Acute/resolved, patient did follow-up with neurosurgery and therapy.  Neither recommending follow-up.  Repeat CT scan was completely clear.  Patient no longer having any symptoms.  Okay to return to work however it did give patient recommendations to avoid heavy lifting until 3/16.  Stated to patient that we can write doctors note if needed for accommodations.  Patient eager to return to work, caution patient that if she were to get headache/worsening of symptoms to follow-up promptly.    F/u with PCP for well adult visit.        No follow-ups on file.      Medications Prescribed:  No orders of the defined types were placed in this encounter.      Future Appointments   Date Time Provider Department Center   5/20/2025  3:00 PM Adelina Browning MD [facility] DEP       HPI:     HPI  49-year-old female with past medical history significant for bradycardia, fall, subarachnoid hemorrhage who presents as an office visit for follow-up to see if she can be released to go back to work.    Patient presented to [facility] after falling down her front steps and slipping.  She did hit the back of her head and did lose consciousness this for short period of time.  CT at the time showed a subarachnoid hemorrhage.  Was admitted briefly to [facility].  Was discharged on 2/15/2025.  Hospital course was unremarkable, patient's symptoms were improved on repeat imaging without progression of the bleed. Neurosurg signed

## 2025-05-20 ENCOUNTER — OFFICE VISIT (OUTPATIENT)
Dept: FAMILY MEDICINE CLINIC | Age: 50
End: 2025-05-20
Payer: COMMERCIAL

## 2025-05-20 VITALS
RESPIRATION RATE: 16 BRPM | BODY MASS INDEX: 30.83 KG/M2 | DIASTOLIC BLOOD PRESSURE: 80 MMHG | SYSTOLIC BLOOD PRESSURE: 120 MMHG | HEART RATE: 77 BPM | OXYGEN SATURATION: 96 % | WEIGHT: 168.6 LBS

## 2025-05-20 DIAGNOSIS — Z72.0 TOBACCO USE: ICD-10-CM

## 2025-05-20 DIAGNOSIS — Z00.00 WELLNESS EXAMINATION: Primary | ICD-10-CM

## 2025-05-20 DIAGNOSIS — Z12.31 SCREENING MAMMOGRAM FOR BREAST CANCER: ICD-10-CM

## 2025-05-20 DIAGNOSIS — N89.8 VAGINAL DRYNESS: ICD-10-CM

## 2025-05-20 DIAGNOSIS — N91.1 SECONDARY AMENORRHEA: ICD-10-CM

## 2025-05-20 PROCEDURE — 99396 PREV VISIT EST AGE 40-64: CPT | Performed by: FAMILY MEDICINE

## 2025-05-20 RX ORDER — VARENICLINE TARTRATE 0.5 MG/1
TABLET, FILM COATED ORAL
Qty: 95 TABLET | Refills: 0 | Status: SHIPPED | OUTPATIENT
Start: 2025-05-20 | End: 2025-06-17

## 2025-05-20 RX ORDER — VARENICLINE TARTRATE 1 MG/1
1 TABLET, FILM COATED ORAL 2 TIMES DAILY
Qty: 60 TABLET | Refills: 5 | Status: SHIPPED | OUTPATIENT
Start: 2025-06-18

## 2025-05-20 ASSESSMENT — PATIENT HEALTH QUESTIONNAIRE - PHQ9
2. FEELING DOWN, DEPRESSED OR HOPELESS: NOT AT ALL
SUM OF ALL RESPONSES TO PHQ QUESTIONS 1-9: 0
1. LITTLE INTEREST OR PLEASURE IN DOING THINGS: NOT AT ALL
SUM OF ALL RESPONSES TO PHQ QUESTIONS 1-9: 0

## 2025-05-20 NOTE — PROGRESS NOTES
[facility] PROFESSIONAL SERVS  [facility]  [address] [address]  Dept: [phone]  Dept Fax: [phone]  Loc: [phone]      Sandy Joy is a 49 y.o. female who presents todayfor her medical conditions/complaints as noted below.  Sandy Joy is c/o of Annual Exam (Yearly /Concern for menopause /Would like to talk about chantix )      :     HPI    Menopause:  Chantix for smoking cessation:  S/p SAH:    Care Gaps:  Breast cancer:   Patient Active Problem List   Diagnosis   (none) - all problems resolved or deleted      Goals    None       The patient has no known allergies.    Medical History  Sandy has a past medical history of Bradycardia, Fall, Subarachnoid hemorrhage (HCC), and Trauma.    Past SurgicalHistory  The patient  has a past surgical history that includes Foot surgery.    Family History  This patient's family history includes Diabetes in her mother; No Known Problems in her father, maternal grandfather, maternal grandmother, paternal grandfather, and paternal grandmother.    Social History  Sandy  reports that she has been smoking cigarettes. She has never used smokeless tobacco. She reports current alcohol use. She reports that she does not use drugs.    Medications    Current Outpatient Medications:     acetaminophen (TYLENOL) 325 MG tablet, Take 2 tablets by mouth every 6 hours as needed for Pain (Patient not taking: Reported on 5/20/2025), Disp: , Rfl:     Subjective:      Review of Systems    Objective:     Vitals:    05/20/25 1502   BP: 120/80   Pulse: 77   Resp: 16   SpO2: 96%   Weight: 76.5 kg (168 lb 9.6 oz)       Physical Exam    Lab Results   Component Value Date    WBC 11.5 (H) 02/14/2025    HGB 14.8 02/14/2025    HCT 43.6 02/14/2025     (H) 02/14/2025    ALT 22 02/14/2025    AST 20 02/14/2025     02/14/2025    K 3.9 02/14/2025     02/14/2025    CREATININE 0.6 02/14/2025    BUN 13 02/14/2025    CO2 21 (L)

## 2025-05-20 NOTE — PROGRESS NOTES
[facility] PROFESSIONAL SERVS  [facility]  [address] [address]  [phone]    Sandy Joy (:  1975) is a 49 y.o. female, here for evaluation of the following chief complaint(s):  Annual Exam (Yearly /Concern for menopause /Would like to talk about chantix )        Assessment & Plan     ASSESSMENT/PLAN:  1. Wellness examination    - Fecal DNA Colorectal cancer screening (Cologuard)  - Lipid Panel; Future  - Comprehensive Metabolic Panel; Future  - CBC with Auto Differential; Future  - Follicle Stimulating Hormone; Future    2. Screening mammogram for breast cancer    - KATELYN DIGITAL SCREEN W OR WO CAD BILATERAL; Future    3. Tobacco use    - varenicline (CHANTIX) 0.5 MG tablet; Take 1 tablet by mouth daily for 3 days, THEN 1 tablet 2 times daily for 4 days, THEN 2 tablets 2 times daily for 21 days.  Dispense: 95 tablet; Refill: 0    Assessment & Plan  1. Health maintenance.  - Her blood pressure readings are within normal range.  - A Cologuard test will be ordered for colon cancer screening. A mammogram will be scheduled.  - Fasting blood work will be ordered to assess cholesterol levels.  - Records from Dr. Izaguirre's office will be requested to determine the date of her last Pap smear.    2. Smoking cessation.  - She has been informed about the potential side effects of Chantix, including vivid dreams, and has expressed understanding and acceptance.  - A prescription for Chantix will be provided, starting with a low dose and gradually increasing it.  - She is advised to continue the medication until she has completely ceased smoking and for several months thereafter to prevent relapse.  - A video on smoking cessation will be made available for her viewing.    3. Menopause.  - She has not menstruated for 2 years following the removal of her Mirena device.  - She suspects she may be experiencing hot flashes and is curious about potential hormone

## 2025-05-21 ENCOUNTER — TELEPHONE (OUTPATIENT)
Dept: FAMILY MEDICINE CLINIC | Age: 50
End: 2025-05-21

## 2025-05-21 PROBLEM — Z72.0 TOBACCO USE: Status: ACTIVE | Noted: 2025-05-21

## 2025-06-03 ENCOUNTER — TELEPHONE (OUTPATIENT)
Dept: FAMILY MEDICINE CLINIC | Age: 50
End: 2025-06-03

## 2025-06-03 DIAGNOSIS — Z12.11 SCREEN FOR COLON CANCER: Primary | ICD-10-CM

## 2025-06-03 NOTE — TELEPHONE ENCOUNTER
Aftab from [employer] called stating patients insurance is not going to cover the cologuard with current diagnosis codes. Would like this changed to z12.11 or z12.12 to possibly be covered.       Any further questions call:  [phone]  Case: [id number]